# Patient Record
Sex: MALE | Race: WHITE | Employment: UNEMPLOYED | URBAN - METROPOLITAN AREA
[De-identification: names, ages, dates, MRNs, and addresses within clinical notes are randomized per-mention and may not be internally consistent; named-entity substitution may affect disease eponyms.]

---

## 2018-08-11 ENCOUNTER — HOSPITAL ENCOUNTER (EMERGENCY)
Age: 73
Discharge: HOME | End: 2018-08-11
Attending: EMERGENCY MEDICINE
Payer: OTHER GOVERNMENT

## 2018-08-11 ENCOUNTER — APPOINTMENT (OUTPATIENT)
Dept: GENERAL RADIOLOGY | Age: 73
End: 2018-08-11
Payer: OTHER GOVERNMENT

## 2018-08-11 VITALS
HEART RATE: 71 BPM | RESPIRATION RATE: 16 BRPM | OXYGEN SATURATION: 96 % | DIASTOLIC BLOOD PRESSURE: 79 MMHG | HEIGHT: 71 IN | SYSTOLIC BLOOD PRESSURE: 160 MMHG | WEIGHT: 185 LBS | BODY MASS INDEX: 25.9 KG/M2 | TEMPERATURE: 97.9 F

## 2018-08-11 DIAGNOSIS — S43.102A ACROMIOCLAVICULAR JOINT SEPARATION, TYPE 3, LEFT, INITIAL ENCOUNTER: Primary | ICD-10-CM

## 2018-08-11 LAB
ABSOLUTE EOS #: 0.3 K/UL (ref 0–0.4)
ABSOLUTE IMMATURE GRANULOCYTE: ABNORMAL K/UL (ref 0–0.3)
ABSOLUTE LYMPH #: 1.4 K/UL (ref 1–4.8)
ABSOLUTE MONO #: 0.5 K/UL (ref 0.1–1.3)
ANION GAP SERPL CALCULATED.3IONS-SCNC: 15 MMOL/L (ref 9–17)
BASOPHILS # BLD: 1 % (ref 0–2)
BASOPHILS ABSOLUTE: 0.1 K/UL (ref 0–0.2)
BUN BLDV-MCNC: 16 MG/DL (ref 8–23)
BUN/CREAT BLD: ABNORMAL (ref 9–20)
CALCIUM SERPL-MCNC: 9.5 MG/DL (ref 8.6–10.4)
CHLORIDE BLD-SCNC: 100 MMOL/L (ref 98–107)
CO2: 22 MMOL/L (ref 20–31)
CREAT SERPL-MCNC: 0.62 MG/DL (ref 0.7–1.2)
DIFFERENTIAL TYPE: ABNORMAL
EOSINOPHILS RELATIVE PERCENT: 3 % (ref 0–4)
GFR AFRICAN AMERICAN: >60 ML/MIN
GFR NON-AFRICAN AMERICAN: >60 ML/MIN
GFR SERPL CREATININE-BSD FRML MDRD: ABNORMAL ML/MIN/{1.73_M2}
GFR SERPL CREATININE-BSD FRML MDRD: ABNORMAL ML/MIN/{1.73_M2}
GLUCOSE BLD-MCNC: 152 MG/DL (ref 70–99)
HCT VFR BLD CALC: 64.3 % (ref 41–53)
HEMOGLOBIN: 20.7 G/DL (ref 13.5–17.5)
IMMATURE GRANULOCYTES: ABNORMAL %
LYMPHOCYTES # BLD: 14 % (ref 24–44)
MCH RBC QN AUTO: 27.2 PG (ref 26–34)
MCHC RBC AUTO-ENTMCNC: 32.2 G/DL (ref 31–37)
MCV RBC AUTO: 84.5 FL (ref 80–100)
MONOCYTES # BLD: 5 % (ref 1–7)
MORPHOLOGY: ABNORMAL
NRBC AUTOMATED: ABNORMAL PER 100 WBC
PDW BLD-RTO: 25.2 % (ref 11.5–14.9)
PLATELET # BLD: 264 K/UL (ref 150–450)
PLATELET ESTIMATE: ABNORMAL
PMV BLD AUTO: 9.8 FL (ref 6–12)
POTASSIUM SERPL-SCNC: 3.9 MMOL/L (ref 3.7–5.3)
RBC # BLD: 7.6 M/UL (ref 4.5–5.9)
RBC # BLD: ABNORMAL 10*6/UL
SEG NEUTROPHILS: 77 % (ref 36–66)
SEGMENTED NEUTROPHILS ABSOLUTE COUNT: 7.7 K/UL (ref 1.3–9.1)
SODIUM BLD-SCNC: 137 MMOL/L (ref 135–144)
WBC # BLD: 10 K/UL (ref 3.5–11)
WBC # BLD: ABNORMAL 10*3/UL

## 2018-08-11 PROCEDURE — 99283 EMERGENCY DEPT VISIT LOW MDM: CPT

## 2018-08-11 PROCEDURE — 96375 TX/PRO/DX INJ NEW DRUG ADDON: CPT

## 2018-08-11 PROCEDURE — 73030 X-RAY EXAM OF SHOULDER: CPT

## 2018-08-11 PROCEDURE — 36415 COLL VENOUS BLD VENIPUNCTURE: CPT

## 2018-08-11 PROCEDURE — 96374 THER/PROPH/DIAG INJ IV PUSH: CPT

## 2018-08-11 PROCEDURE — 80048 BASIC METABOLIC PNL TOTAL CA: CPT

## 2018-08-11 PROCEDURE — 85025 COMPLETE CBC W/AUTO DIFF WBC: CPT

## 2018-08-11 PROCEDURE — 6360000002 HC RX W HCPCS: Performed by: EMERGENCY MEDICINE

## 2018-08-11 RX ORDER — MORPHINE SULFATE 4 MG/ML
4 INJECTION, SOLUTION INTRAMUSCULAR; INTRAVENOUS ONCE
Status: COMPLETED | OUTPATIENT
Start: 2018-08-11 | End: 2018-08-11

## 2018-08-11 RX ORDER — ONDANSETRON 2 MG/ML
4 INJECTION INTRAMUSCULAR; INTRAVENOUS ONCE
Status: COMPLETED | OUTPATIENT
Start: 2018-08-11 | End: 2018-08-11

## 2018-08-11 RX ORDER — HYDROCODONE BITARTRATE AND ACETAMINOPHEN 5; 325 MG/1; MG/1
1 TABLET ORAL EVERY 6 HOURS PRN
Qty: 10 TABLET | Refills: 0 | Status: SHIPPED | OUTPATIENT
Start: 2018-08-11 | End: 2018-08-18

## 2018-08-11 RX ORDER — IBUPROFEN 800 MG/1
800 TABLET ORAL EVERY 8 HOURS PRN
Qty: 30 TABLET | Refills: 0 | Status: SHIPPED | OUTPATIENT
Start: 2018-08-11

## 2018-08-11 RX ADMIN — ONDANSETRON 4 MG: 2 INJECTION INTRAMUSCULAR; INTRAVENOUS at 03:10

## 2018-08-11 RX ADMIN — MORPHINE SULFATE 4 MG: 4 INJECTION INTRAVENOUS at 03:10

## 2018-08-11 ASSESSMENT — PAIN SCALES - GENERAL
PAINLEVEL_OUTOF10: 3
PAINLEVEL_OUTOF10: 3
PAINLEVEL_OUTOF10: 4

## 2018-08-11 ASSESSMENT — PAIN DESCRIPTION - DESCRIPTORS: DESCRIPTORS: ACHING;JABBING

## 2018-08-11 ASSESSMENT — ENCOUNTER SYMPTOMS
ABDOMINAL PAIN: 0
COLOR CHANGE: 0
CONSTIPATION: 0
NAUSEA: 0
BLOOD IN STOOL: 0
SORE THROAT: 0
COUGH: 0
BACK PAIN: 0
SHORTNESS OF BREATH: 0
DIARRHEA: 0
TROUBLE SWALLOWING: 0
VOMITING: 0

## 2018-08-11 ASSESSMENT — PAIN DESCRIPTION - ORIENTATION
ORIENTATION: LEFT
ORIENTATION: LEFT

## 2018-08-11 ASSESSMENT — PAIN DESCRIPTION - PAIN TYPE
TYPE: ACUTE PAIN
TYPE: ACUTE PAIN

## 2018-08-11 ASSESSMENT — PAIN DESCRIPTION - LOCATION
LOCATION: SHOULDER
LOCATION: SHOULDER

## 2018-08-11 NOTE — ED PROVIDER NOTES
[address]  eMERGENCY dEPARTMENT eNCOUnter    Pt Name: Hanane Good  MRN: [id number]  YOB: 1945  Date of evaluation: 8/11/18  PCP: No primary care provider on file. CHIEF COMPLAINT       Chief Complaint   Patient presents with    Shoulder Injury     fell hit brick wall right side       HISTORY OF PRESENT ILLNESS    Hanane Good is a 68 y.o. male who presents With left shoulder pain. At approximately 1:30 AM today patient was walking down his driveway and tripped. He fell onto a brick wall and hit his left shoulder. He did not hit his head and did not lose consciousness. He was able to stand up and ambulate. Complain of pain in his left shoulder this 3 out of 10 in severity. Movement makes it worse. Nothing alleviates the pain. No numbness or tingling in his arm. Denies any neck or back pain. Symptoms are acute. Patient has no other additional complaints at this time. REVIEW OF SYSTEMS       Review of Systems   Constitutional: Negative for chills, fatigue and fever. HENT: Negative for congestion, ear pain, sore throat and trouble swallowing. Eyes: Negative for visual disturbance. Respiratory: Negative for cough and shortness of breath. Cardiovascular: Negative for chest pain, palpitations and leg swelling. Gastrointestinal: Negative for abdominal pain, blood in stool, constipation, diarrhea, nausea and vomiting. Genitourinary: Negative for dysuria and flank pain. Musculoskeletal: Positive for arthralgias and joint swelling. Negative for back pain, myalgias and neck pain. Skin: Negative for color change, rash and wound. Neurological: Negative for dizziness, weakness, light-headedness, numbness and headaches. Psychiatric/Behavioral: Negative for confusion. All other systems reviewed and are negative. Negative in 10 essential Systems except as mentioned above and in the HPI.         PAST MEDICAL HISTORY    has a past medical history of Arthritis; CAD vitals reviewed. DIFFERENTIAL DIAGNOSIS/MDM:   80-year-old male presents with left shoulder pain after mechanical fall. He does have a deformity concerning for possible fracture or dislocation. Sensation is intact distally. Pulses are intact distally. Motor functions intact distally. No other injuries found. He does have some abrasions on the left shoulder as well. We'll get x-ray, treat pain. DIAGNOSTIC RESULTS     EKG: All EKG's are interpreted by the Emergency Department Physician who either signs or Co-signs this chart in the absence of a cardiologist.        RADIOLOGY:   I directly visualized the following  images and reviewed the radiologist interpretations:  XR SHOULDER LEFT (MIN 2 VIEWS)   Final Result   Grade 3 left AC joint separation. ED BEDSIDE ULTRASOUND:      LABS:  Labs Reviewed   CBC WITH AUTO DIFFERENTIAL - Abnormal; Notable for the following:        Result Value    RBC 7.60 (*)     Hemoglobin 20.7 (*)     Hematocrit 64.3 (*)     RDW 25.2 (*)     All other components within normal limits   BASIC METABOLIC PANEL - Abnormal; Notable for the following:     Glucose 152 (*)     CREATININE 0.62 (*)     All other components within normal limits         EMERGENCY DEPARTMENT COURSE:   Vitals:    Vitals:    08/11/18 0242 08/11/18 0318   BP: (!) 160/79    Pulse: 71 71   Resp: 16    TempSrc: Oral    SpO2: 96%    Weight: 185 lb (83.9 kg)    Height: 5' 11\" (1.803 m)      3:48 AM  Patient has a grade 3 AC joint separation of the left shoulder. No evidence of fracture. No glenohumeral dislocation. We'll place in sling and have him follow up with orthopedic surgery. We'll discharge her with pain medication. Advised to return if he develops any worsening pain, numbness, tingling or any other symptoms. Patient agreeable plan will be discharged home today. CRITICAL CARE:      CONSULTS:  None      PROCEDURES:      FINAL IMPRESSION      1.  Acromioclavicular joint separation, type 3, left, initial encounter            DISPOSITION/PLAN   DISPOSITION      Discharged home      PATIENT REFERRED TO:  [facility] ED  Donis Marquez [address]  [address] [address]  [phone]    As needed, If symptoms worsen    Shelby Maciel MD  [facility]  [address] [facility]  [address] [facility] [address]  [phone]    Schedule an appointment as soon as possible for a visit         DISCHARGE MEDICATIONS:  New Prescriptions    HYDROCODONE-ACETAMINOPHEN (NORCO) 5-325 MG PER TABLET    Take 1 tablet by mouth every 6 hours as needed for Pain for up to 7 days. .    IBUPROFEN (ADVIL;MOTRIN) 800 MG TABLET    Take 1 tablet by mouth every 8 hours as needed for Pain       (Please note that portions of this note were completed with a voice recognition program.  Efforts were made to edit the dictations but occasionally words are mis-transcribed.)    Ania Foster DO  Attending Emergency Physician          Ania Foster DO  08/11/18 8853

## 2022-12-21 ENCOUNTER — HOSPITAL ENCOUNTER (INPATIENT)
Age: 77
LOS: 2 days | Discharge: HOME | End: 2022-12-23
Attending: EMERGENCY MEDICINE | Admitting: STUDENT IN AN ORGANIZED HEALTH CARE EDUCATION/TRAINING PROGRAM
Payer: OTHER GOVERNMENT

## 2022-12-21 DIAGNOSIS — I82.402 ACUTE DEEP VEIN THROMBOSIS (DVT) OF LEFT LOWER EXTREMITY, UNSPECIFIED VEIN (HCC): Primary | ICD-10-CM

## 2022-12-21 DIAGNOSIS — L03.116 CELLULITIS OF LEFT LOWER EXTREMITY: ICD-10-CM

## 2022-12-21 PROBLEM — I25.10 CORONARY ARTERY DISEASE: Status: ACTIVE | Noted: 2022-12-21

## 2022-12-21 PROBLEM — I10 HYPERTENSION: Status: ACTIVE | Noted: 2022-12-21

## 2022-12-21 PROBLEM — Z85.51 HISTORY OF BLADDER CANCER: Status: ACTIVE | Noted: 2022-12-21

## 2022-12-21 PROBLEM — K74.60 LIVER CIRRHOSIS (HCC): Status: ACTIVE | Noted: 2022-12-21

## 2022-12-21 PROBLEM — H91.90 HEARING LOSS: Status: ACTIVE | Noted: 2022-12-21

## 2022-12-21 PROBLEM — E11.9 TYPE 2 DIABETES MELLITUS (HCC): Status: ACTIVE | Noted: 2022-12-21

## 2022-12-21 PROBLEM — Z85.05 HISTORY OF HEPATOCELLULAR CARCINOMA: Status: ACTIVE | Noted: 2022-12-21

## 2022-12-21 PROBLEM — I50.9 CHF (CONGESTIVE HEART FAILURE) (HCC): Status: ACTIVE | Noted: 2022-12-21

## 2022-12-21 PROBLEM — G20 PARKINSON'S DISEASE (HCC): Status: ACTIVE | Noted: 2022-12-21

## 2022-12-21 PROBLEM — I82.4Z2 DVT, LOWER EXTREMITY, DISTAL, ACUTE, LEFT (HCC): Status: ACTIVE | Noted: 2022-12-21

## 2022-12-21 LAB
ABSOLUTE EOS #: 0.23 K/UL (ref 0–0.44)
ABSOLUTE IMMATURE GRANULOCYTE: 0.18 K/UL (ref 0–0.3)
ABSOLUTE LYMPH #: 0.96 K/UL (ref 1.1–3.7)
ABSOLUTE MONO #: 0.66 K/UL (ref 0.1–1.2)
ANION GAP SERPL CALCULATED.3IONS-SCNC: 12 MMOL/L (ref 9–17)
BASOPHILS # BLD: 1 % (ref 0–2)
BASOPHILS ABSOLUTE: 0.14 K/UL (ref 0–0.2)
BUN BLDV-MCNC: 22 MG/DL (ref 8–23)
CALCIUM SERPL-MCNC: 9.1 MG/DL (ref 8.6–10.4)
CHLORIDE BLD-SCNC: 96 MMOL/L (ref 98–107)
CO2: 26 MMOL/L (ref 20–31)
CREAT SERPL-MCNC: 0.98 MG/DL (ref 0.7–1.2)
D-DIMER QUANTITATIVE: 0.7 MG/L FEU
EOSINOPHILS RELATIVE PERCENT: 2 % (ref 1–4)
GFR SERPL CREATININE-BSD FRML MDRD: >60 ML/MIN/1.73M2
GLUCOSE BLD-MCNC: 247 MG/DL (ref 70–99)
HCT VFR BLD CALC: 43.4 % (ref 40.7–50.3)
HEMOGLOBIN: 12.9 G/DL (ref 13–17)
IMMATURE GRANULOCYTES: 1 %
LYMPHOCYTES # BLD: 8 % (ref 24–43)
MCH RBC QN AUTO: 29.3 PG (ref 25.2–33.5)
MCHC RBC AUTO-ENTMCNC: 29.7 G/DL (ref 28.4–34.8)
MCV RBC AUTO: 98.4 FL (ref 82.6–102.9)
MONOCYTES # BLD: 5 % (ref 3–12)
NRBC AUTOMATED: 0 PER 100 WBC
PARTIAL THROMBOPLASTIN TIME: >120 SEC (ref 20.5–30.5)
PARTIAL THROMBOPLASTIN TIME: >120 SEC (ref 20.5–30.5)
PDW BLD-RTO: 15.1 % (ref 11.8–14.4)
PLATELET # BLD: 199 K/UL (ref 138–453)
PMV BLD AUTO: 13.1 FL (ref 8.1–13.5)
POTASSIUM SERPL-SCNC: 4.2 MMOL/L (ref 3.7–5.3)
RBC # BLD: 4.41 M/UL (ref 4.21–5.77)
RBC # BLD: ABNORMAL 10*6/UL
REASON FOR REJECTION: NORMAL
SEG NEUTROPHILS: 83 % (ref 36–65)
SEGMENTED NEUTROPHILS ABSOLUTE COUNT: 10.25 K/UL (ref 1.5–8.1)
SODIUM BLD-SCNC: 134 MMOL/L (ref 135–144)
WBC # BLD: 12.4 K/UL (ref 3.5–11.3)
ZZ NTE CLEAN UP: ORDERED TEST: NORMAL
ZZ NTE WITH NAME CLEAN UP: SPECIMEN SOURCE: NORMAL

## 2022-12-21 PROCEDURE — 6370000000 HC RX 637 (ALT 250 FOR IP): Performed by: STUDENT IN AN ORGANIZED HEALTH CARE EDUCATION/TRAINING PROGRAM

## 2022-12-21 PROCEDURE — 2060000000 HC ICU INTERMEDIATE R&B

## 2022-12-21 PROCEDURE — 80048 BASIC METABOLIC PNL TOTAL CA: CPT

## 2022-12-21 PROCEDURE — 85379 FIBRIN DEGRADATION QUANT: CPT

## 2022-12-21 PROCEDURE — 85730 THROMBOPLASTIN TIME PARTIAL: CPT

## 2022-12-21 PROCEDURE — 85025 COMPLETE CBC W/AUTO DIFF WBC: CPT

## 2022-12-21 PROCEDURE — 93971 EXTREMITY STUDY: CPT

## 2022-12-21 PROCEDURE — 96374 THER/PROPH/DIAG INJ IV PUSH: CPT

## 2022-12-21 PROCEDURE — 99285 EMERGENCY DEPT VISIT HI MDM: CPT

## 2022-12-21 PROCEDURE — 99254 IP/OBS CNSLTJ NEW/EST MOD 60: CPT | Performed by: STUDENT IN AN ORGANIZED HEALTH CARE EDUCATION/TRAINING PROGRAM

## 2022-12-21 PROCEDURE — 6360000002 HC RX W HCPCS: Performed by: STUDENT IN AN ORGANIZED HEALTH CARE EDUCATION/TRAINING PROGRAM

## 2022-12-21 RX ORDER — CEPHALEXIN 500 MG/1
500 CAPSULE ORAL 4 TIMES DAILY
Status: DISCONTINUED | OUTPATIENT
Start: 2022-12-21 | End: 2022-12-23 | Stop reason: HOSPADM

## 2022-12-21 RX ORDER — SODIUM CHLORIDE 0.9 % (FLUSH) 0.9 %
5-40 SYRINGE (ML) INJECTION EVERY 12 HOURS SCHEDULED
Status: DISCONTINUED | OUTPATIENT
Start: 2022-12-21 | End: 2022-12-23 | Stop reason: HOSPADM

## 2022-12-21 RX ORDER — HEPARIN SODIUM 1000 [USP'U]/ML
80 INJECTION, SOLUTION INTRAVENOUS; SUBCUTANEOUS ONCE
Status: COMPLETED | OUTPATIENT
Start: 2022-12-21 | End: 2022-12-21

## 2022-12-21 RX ORDER — SODIUM CHLORIDE 0.9 % (FLUSH) 0.9 %
5-40 SYRINGE (ML) INJECTION PRN
Status: DISCONTINUED | OUTPATIENT
Start: 2022-12-21 | End: 2022-12-23 | Stop reason: HOSPADM

## 2022-12-21 RX ORDER — DEXTROSE MONOHYDRATE 100 MG/ML
INJECTION, SOLUTION INTRAVENOUS CONTINUOUS PRN
Status: DISCONTINUED | OUTPATIENT
Start: 2022-12-21 | End: 2022-12-22

## 2022-12-21 RX ORDER — DOXAZOSIN 2 MG/1
4 TABLET ORAL DAILY
Status: DISCONTINUED | OUTPATIENT
Start: 2022-12-21 | End: 2022-12-23 | Stop reason: HOSPADM

## 2022-12-21 RX ORDER — POTASSIUM CHLORIDE 750 MG/1
10 TABLET, FILM COATED, EXTENDED RELEASE ORAL DAILY
Status: ON HOLD | COMMUNITY
Start: 2022-10-12 | End: 2022-12-22

## 2022-12-21 RX ORDER — ACETAMINOPHEN 325 MG/1
650 TABLET ORAL EVERY 6 HOURS PRN
Status: DISCONTINUED | OUTPATIENT
Start: 2022-12-21 | End: 2022-12-23 | Stop reason: HOSPADM

## 2022-12-21 RX ORDER — FUROSEMIDE 40 MG/1
40 TABLET ORAL EVERY MORNING
Status: DISCONTINUED | OUTPATIENT
Start: 2022-12-22 | End: 2022-12-23 | Stop reason: HOSPADM

## 2022-12-21 RX ORDER — OMEPRAZOLE 20 MG/1
20 CAPSULE, DELAYED RELEASE ORAL
COMMUNITY
Start: 2022-04-28

## 2022-12-21 RX ORDER — ASPIRIN 81 MG/1
81 TABLET ORAL DAILY
Status: DISCONTINUED | OUTPATIENT
Start: 2022-12-21 | End: 2022-12-23 | Stop reason: HOSPADM

## 2022-12-21 RX ORDER — CARVEDILOL 12.5 MG/1
12.5 TABLET ORAL 2 TIMES DAILY WITH MEALS
COMMUNITY
Start: 2022-11-10

## 2022-12-21 RX ORDER — HEPARIN SODIUM 1000 [USP'U]/ML
80 INJECTION, SOLUTION INTRAVENOUS; SUBCUTANEOUS PRN
Status: DISCONTINUED | OUTPATIENT
Start: 2022-12-21 | End: 2022-12-23 | Stop reason: HOSPADM

## 2022-12-21 RX ORDER — ONDANSETRON 2 MG/ML
4 INJECTION INTRAMUSCULAR; INTRAVENOUS EVERY 6 HOURS PRN
Status: DISCONTINUED | OUTPATIENT
Start: 2022-12-21 | End: 2022-12-23 | Stop reason: HOSPADM

## 2022-12-21 RX ORDER — SPIRONOLACTONE 25 MG/1
75 TABLET ORAL EVERY MORNING
COMMUNITY
Start: 2022-05-05

## 2022-12-21 RX ORDER — ACETAMINOPHEN 650 MG/1
650 SUPPOSITORY RECTAL EVERY 6 HOURS PRN
Status: DISCONTINUED | OUTPATIENT
Start: 2022-12-21 | End: 2022-12-23 | Stop reason: HOSPADM

## 2022-12-21 RX ORDER — INSULIN LISPRO 100 [IU]/ML
0-4 INJECTION, SOLUTION INTRAVENOUS; SUBCUTANEOUS
Status: DISCONTINUED | OUTPATIENT
Start: 2022-12-22 | End: 2022-12-22 | Stop reason: SDUPTHER

## 2022-12-21 RX ORDER — ONDANSETRON 4 MG/1
4 TABLET, ORALLY DISINTEGRATING ORAL EVERY 8 HOURS PRN
Status: DISCONTINUED | OUTPATIENT
Start: 2022-12-21 | End: 2022-12-23 | Stop reason: HOSPADM

## 2022-12-21 RX ORDER — DULOXETIN HYDROCHLORIDE 20 MG/1
20 CAPSULE, DELAYED RELEASE ORAL DAILY
COMMUNITY
Start: 2022-09-26

## 2022-12-21 RX ORDER — LOSARTAN POTASSIUM 50 MG/1
50 TABLET ORAL DAILY
COMMUNITY
Start: 2022-11-10

## 2022-12-21 RX ORDER — FUROSEMIDE 40 MG/1
40 TABLET ORAL EVERY MORNING
COMMUNITY
Start: 2022-10-24

## 2022-12-21 RX ORDER — SODIUM CHLORIDE 9 MG/ML
INJECTION, SOLUTION INTRAVENOUS PRN
Status: DISCONTINUED | OUTPATIENT
Start: 2022-12-21 | End: 2022-12-23 | Stop reason: HOSPADM

## 2022-12-21 RX ORDER — POLYETHYLENE GLYCOL 3350 17 G/17G
17 POWDER, FOR SOLUTION ORAL DAILY PRN
Status: DISCONTINUED | OUTPATIENT
Start: 2022-12-21 | End: 2022-12-23 | Stop reason: HOSPADM

## 2022-12-21 RX ORDER — TERAZOSIN 2 MG/1
8 CAPSULE ORAL NIGHTLY
COMMUNITY
Start: 2022-10-12

## 2022-12-21 RX ORDER — HEPARIN SODIUM 1000 [USP'U]/ML
40 INJECTION, SOLUTION INTRAVENOUS; SUBCUTANEOUS PRN
Status: DISCONTINUED | OUTPATIENT
Start: 2022-12-21 | End: 2022-12-23 | Stop reason: HOSPADM

## 2022-12-21 RX ORDER — HEPARIN SODIUM AND DEXTROSE 10000; 5 [USP'U]/100ML; G/100ML
5-30 INJECTION INTRAVENOUS CONTINUOUS
Status: DISCONTINUED | OUTPATIENT
Start: 2022-12-21 | End: 2022-12-23 | Stop reason: HOSPADM

## 2022-12-21 RX ORDER — INSULIN LISPRO 100 [IU]/ML
0-4 INJECTION, SOLUTION INTRAVENOUS; SUBCUTANEOUS NIGHTLY
Status: DISCONTINUED | OUTPATIENT
Start: 2022-12-21 | End: 2022-12-22 | Stop reason: SDUPTHER

## 2022-12-21 RX ORDER — HYDROXYUREA 500 MG/1
500 CAPSULE ORAL DAILY
COMMUNITY
Start: 2022-09-01

## 2022-12-21 RX ORDER — ASPIRIN 81 MG/1
81 TABLET ORAL DAILY
COMMUNITY
Start: 2017-02-21

## 2022-12-21 RX ADMIN — HEPARIN SODIUM 7260 UNITS: 1000 INJECTION INTRAVENOUS; SUBCUTANEOUS at 18:49

## 2022-12-21 RX ADMIN — CEPHALEXIN 500 MG: 500 CAPSULE ORAL at 18:02

## 2022-12-21 RX ADMIN — HEPARIN SODIUM 18 UNITS/KG/HR: 10000 INJECTION, SOLUTION INTRAVENOUS at 18:54

## 2022-12-21 ASSESSMENT — ENCOUNTER SYMPTOMS
ABDOMINAL PAIN: 0
ALLERGIC/IMMUNOLOGIC COMMENTS: NKA
BACK PAIN: 0
FACIAL SWELLING: 0
SHORTNESS OF BREATH: 0

## 2022-12-21 ASSESSMENT — PAIN - FUNCTIONAL ASSESSMENT: PAIN_FUNCTIONAL_ASSESSMENT: 0-10

## 2022-12-21 ASSESSMENT — PAIN SCALES - GENERAL: PAINLEVEL_OUTOF10: 6

## 2022-12-21 NOTE — ED NOTES
The following labs were labeled with appropriate pt sticker and tubed to lab:     [x] Blue     [x] Lavender   [] on ice  [x] Green/yellow  [] Green/black [] on ice  [] Arlester Core  [] on ice  [] Yellow  [] Red  [] Type/ Screen  [] ABG  [] VBG    [] COVID-19 swab    [] Rapid  [] PCR  [] Flu swab  [] Peds Viral Panel     [] Urine Sample  [] Fecal Sample  [] Pelvic Cultures  [] Blood Cultures  [] X 2  [] STREP Cultures         Rekha Calvin RN  12/21/22 5370

## 2022-12-21 NOTE — ED PROVIDER NOTES
[address]  ED  Emergency Department Encounter  Emergency Medicine Resident     Pt Name:Fran Cheung  MRN: [id number]  [place] 1945  Date of evaluation: 12/21/22  PCP:  No primary care provider on file. CHIEF COMPLAINT       Chief Complaint   Patient presents with    Leg Swelling     Redness, pt believes it may be a DVT       HISTORY OF PRESENT ILLNESS  (Location/Symptom, Timing/Onset, Context/Setting, Quality, Duration, Modifying Factors, Severity.)      Krystal Sosa is a 68 y.o. male who presents with left leg swelling that has been ongoing for the past few days. Patient reports that the swelling has been progressively worsening. Started as a small red area on the medial side of his left calf and has expanded. Patient does report that he had chills the other day, did not have any fevers. Does not have any history of blood clots. Does have history of cirrhosis, diabetes. No recent injuries to the leg. No recent infections. Patient reports that he is on around 17 medications, is unable to provide list at this time however he will talk with his wife. Does report that he takes baby aspirin. Pain 6/10. PAST MEDICAL / SURGICAL / SOCIAL / FAMILY HISTORY      has a past medical history of Arthritis, CAD (coronary artery disease), Cancer ([address] Cirrhosis ([address] Diabetes mellitus ([address] Hypertension, and Parkinson disease ([address] has a past surgical history that includes Coronary angioplasty with stent (2003); Mouth surgery (06/2018); and Cochlear implant (Bilateral).     Social History     Socioeconomic History    Marital status:      Spouse name: Not on file    Number of children: Not on file    Years of education: Not on file    Highest education level: Not on file   Occupational History    Not on file   Tobacco Use    Smoking status: Former    Smokeless tobacco: Never   Substance and Sexual Activity    Alcohol use: No    Drug use: No    Sexual activity: Not on file Other Topics Concern    Not on file   Social History Narrative    Not on file     Social Determinants of Health     Financial Resource Strain: Not on file   Food Insecurity: Not on file   Transportation Needs: Not on file   Physical Activity: Not on file   Stress: Not on file   Social Connections: Not on file   Intimate Partner Violence: Not on file   Housing Stability: Not on file       No family history on file. Allergies:  Patient has no known allergies. Home Medications:  Prior to Admission medications    Medication Sig Start Date End Date Taking? Authorizing Provider   ibuprofen (ADVIL;MOTRIN) 800 MG tablet Take 1 tablet by mouth every 8 hours as needed for Pain 8/11/18   Jose Brown, DO       REVIEW OF SYSTEMS    (2-9 systems for level 4, 10 or more for level 5)      Review of Systems   Constitutional:  Positive for chills. Negative for fever. HENT:  Negative for facial swelling. Respiratory:  Negative for shortness of breath. Cardiovascular:  Positive for leg swelling. Negative for chest pain. Gastrointestinal:  Negative for abdominal pain. Musculoskeletal:  Negative for back pain. Skin:  Negative for wound. Allergic/Immunologic:        NKA   Neurological:  Negative for headaches. Hematological:         Baby aspirin   Psychiatric/Behavioral:  Negative for confusion. PHYSICAL EXAM   (up to 7 for level 4, 8 or more for level 5)      INITIAL VITALS:   /73   Pulse 92   Temp 96.9 °F (36.1 °C)   Resp 16   Ht 5' 11\" (1.803 m)   Wt 200 lb (90.7 kg)   SpO2 97%   BMI 27.89 kg/m²     Physical Exam  Constitutional:       General: He is not in acute distress. HENT:      Head: Normocephalic and atraumatic. Right Ear: External ear normal.      Left Ear: External ear normal.      Nose: Nose normal.   Eyes:      Conjunctiva/sclera: Conjunctivae normal.   Cardiovascular:      Rate and Rhythm: Normal rate. Pulses: Normal pulses.    Pulmonary:      Effort: Pulmonary effort is normal. No respiratory distress. Abdominal:      General: Abdomen is flat. There is no distension. Palpations: Abdomen is soft. Tenderness: There is no abdominal tenderness. There is no guarding or rebound. Musculoskeletal:      Cervical back: No tenderness. Comments: Left lower extremity with 1+ pitting edema, area of erythema, warmth that he reports is tender to palpation. Skin:     General: Skin is warm. Capillary Refill: Capillary refill takes less than 2 seconds. Neurological:      Mental Status: He is alert and oriented to person, place, and time.    Psychiatric:         Mood and Affect: Mood normal.       DIFFERENTIAL  DIAGNOSIS     PLAN (LABS / IMAGING / EKG):  Orders Placed This Encounter   Procedures    VL DUP LOWER EXTREMITY VENOUS LEFT    CBC with Auto Differential    Basic Metabolic Panel    D-Dimer, Quantitative    Inpatient consult to Hospitalist    Inpatient consult to Vascular Surgery       MEDICATIONS ORDERED:  Orders Placed This Encounter   Medications    FOLLOWED BY Linked Order Group     rivaroxaban (XARELTO) tablet 15 mg      Order Specific Question:   Indication of Use      Answer:   Treatment-DVT/PE     rivaroxaban (XARELTO) tablet 20 mg      Order Specific Question:   Indication of Use      Answer:   Treatment-DVT/PE    cephALEXin (KEFLEX) capsule 500 mg     Order Specific Question:   Antimicrobial Indications     Answer:   Skin and Soft Tissue Infection       DDX: DVT, cellulitis, fracture, sprain, strain    DIAGNOSTIC RESULTS / EMERGENCY DEPARTMENT COURSE / MDM   LAB RESULTS:  Results for orders placed or performed during the hospital encounter of 12/21/22   CBC with Auto Differential   Result Value Ref Range    WBC 12.4 (H) 3.5 - 11.3 k/uL    RBC 4.41 4.21 - 5.77 m/uL    Hemoglobin 12.9 (L) 13.0 - 17.0 g/dL    Hematocrit 43.4 40.7 - 50.3 %    MCV 98.4 82.6 - 102.9 fL    MCH 29.3 25.2 - 33.5 pg    MCHC 29.7 28.4 - 34.8 g/dL    RDW 15.1 (H) 11.8 - 14.4 % Platelets 477 750 - 230 k/uL    MPV 13.1 8.1 - 13.5 fL    NRBC Automated 0.0 0.0 per 100 WBC    Seg Neutrophils 83 (H) 36 - 65 %    Lymphocytes 8 (L) 24 - 43 %    Monocytes 5 3 - 12 %    Eosinophils % 2 1 - 4 %    Basophils 1 0 - 2 %    Immature Granulocytes 1 (H) 0 %    Segs Absolute 10.25 (H) 1.50 - 8.10 k/uL    Absolute Lymph # 0.96 (L) 1.10 - 3.70 k/uL    Absolute Mono # 0.66 0.10 - 1.20 k/uL    Absolute Eos # 0.23 0.00 - 0.44 k/uL    Basophils Absolute 0.14 0.00 - 0.20 k/uL    Absolute Immature Granulocyte 0.18 0.00 - 0.30 k/uL    RBC Morphology ANISOCYTOSIS PRESENT    Basic Metabolic Panel   Result Value Ref Range    Glucose 247 (H) 70 - 99 mg/dL    BUN 22 8 - 23 mg/dL    Creatinine 0.98 0.70 - 1.20 mg/dL    Est, Glom Filt Rate >60 >60 mL/min/1.73m2    Calcium 9.1 8.6 - 10.4 mg/dL    Sodium 134 (L) 135 - 144 mmol/L    Potassium 4.2 3.7 - 5.3 mmol/L    Chloride 96 (L) 98 - 107 mmol/L    CO2 26 20 - 31 mmol/L    Anion Gap 12 9 - 17 mmol/L   D-Dimer, Quantitative   Result Value Ref Range    D-Dimer, Quant 0.70 mg/L FEU         RADIOLOGY:  VL DUP LOWER EXTREMITY VENOUS LEFT    (Results Pending)           EMERGENCY DEPARTMENT COURSE:  ED Course as of 12/21/22 1803   Wed Dec 21, 2022   1545 WBC(!): 12.4  Possibly cellulitis. [ML]   1552 D-Dimer, Quant: 0.70  Going for DVT ultrasound. [ML]   56 Spoke with vascular lab, patient has extensive deep vein thrombosis starting at the distal greater saphenous vein going to saphenous femoral junction. [ML]   2070 D/w pharmacy will be starting him on Xarelto. [ML]   1726 Admitted to medicine, will obtain vasc consult. [ML]      ED Course User Index  [ML] Jackie Mullins, DO       CONSULTS:  IP CONSULT TO HOSPITALIST  IP CONSULT TO VASCULAR SURGERY      FINAL IMPRESSION      1. Acute deep vein thrombosis (DVT) of left lower extremity, unspecified vein (HCC)    2.  Cellulitis of left lower extremity          DISPOSITION / PLAN     DISPOSITION Decision To Admit 12/21/2022 05:29:56 PM          Gabby Bell DO  Emergency Medicine Resident    (Please note that portions of thisnote were completed with a voice recognition program.  Efforts were made to edit the dictations but occasionally words are mis-transcribed.)       Emiliano Hilton DO  Resident  12/21/22 2778

## 2022-12-21 NOTE — PROGRESS NOTES
Direct oral anticoagulant (DOAC) - Initial Pharmacy Review  New start? Yes  DOAC/dose: Xarelto 15 mg twice daily for 21 days then 20 mg daily  Indication: New onset DVT    Recent Labs     12/21/22  1533   HGB 12.9*   HCT 43.4        No results for input(s): INR in the last 72 hours. Recent Labs     12/21/22  1533   CREATININE 0.98     Estimated Creatinine Clearance: 73 mL/min (based on SCr of 0.98 mg/dL). Significant Drug-Drug Interactions: No interactions/no new drug interactions identified requiring action. Notes: No obvious intervention needed.

## 2022-12-21 NOTE — ED TRIAGE NOTES
Patient arrived to ED with c/o left leg pain and swelling that has been persistent for several days. Patient reports redness in LLE calf. Patient denies CP, SOB, and cough at this time. Patient is Aox4 and shows no signs of distress at this time.   Dr. Ted Tierney is at bedside for eval.

## 2022-12-21 NOTE — ED NOTES
The following labs were labeled with appropriate pt sticker and tubed to lab:     [x] Blue     [] Lavender   [] on ice  [] Green/yellow  [] Green/black [] on ice  [] Jina Postal  [] on ice  [] Yellow  [] Red  [] Type/ Screen  [] ABG  [] VBG    [] COVID-19 swab    [] Rapid  [] PCR  [] Flu swab  [] Peds Viral Panel     [] Urine Sample  [] Fecal Sample  [] Pelvic Cultures  [] Blood Cultures  [] X 2  [] STREP Cultures         Summer CARMEN Calvin  12/21/22 5673

## 2022-12-21 NOTE — ED PROVIDER NOTES
[address] HANDOFF       Handoff taken on the following patient from prior Attending Physician:  Pt Name: Porfirio Rogers  PCP:  No primary care provider on file. Attestation  I was available and discussed any additional care issues that arose and coordinated the management plans with the resident(s) caring for the patient during my duty period. Any areas of disagreement with resident's documentation of care or procedures are noted on the chart. I was personally present for the key portions of any/all procedures during my duty period. I have documented in the chart those procedures where I was not present during the key portions. CHIEF COMPLAINT       Chief Complaint   Patient presents with    Leg Swelling     Redness, pt believes it may be a DVT         CURRENT MEDICATIONS     Previous Medications  Previous Medications    IBUPROFEN (ADVIL;MOTRIN) 800 MG TABLET    Take 1 tablet by mouth every 8 hours as needed for Pain       Encounter Medications  Orders Placed This Encounter   Medications    DISCONTD: rivaroxaban (XARELTO) tablet 15 mg     Order Specific Question:   Indication of Use     Answer:   Treatment-DVT/PE    DISCONTD: rivaroxaban (XARELTO) tablet 20 mg     Order Specific Question:   Indication of Use     Answer:   Treatment-DVT/PE    cephALEXin (KEFLEX) capsule 500 mg     Order Specific Question:   Antimicrobial Indications     Answer:   Skin and Soft Tissue Infection    heparin (porcine) injection 7,260 Units    heparin (porcine) injection 7,260 Units    heparin (porcine) injection 3,630 Units    heparin 25,000 units in dextrose 5 % 250 mL infusion (rate based)       ALLERGIES     has No Known Allergies.       RECENT VITALS:   Temp: 96.9 °F (36.1 °C),  Heart Rate: 92, Resp: 16, BP: 137/73    RADIOLOGY:   VL DUP LOWER EXTREMITY VENOUS LEFT    (Results Pending)       LABS:  Labs Reviewed   CBC WITH AUTO DIFFERENTIAL - Abnormal; Notable for the following components: Result Value    WBC 12.4 (*)     Hemoglobin 12.9 (*)     RDW 15.1 (*)     Seg Neutrophils 83 (*)     Lymphocytes 8 (*)     Immature Granulocytes 1 (*)     Segs Absolute 10.25 (*)     Absolute Lymph # 0.96 (*)     All other components within normal limits   BASIC METABOLIC PANEL - Abnormal; Notable for the following components:    Glucose 247 (*)     Sodium 134 (*)     Chloride 96 (*)     All other components within normal limits   D-DIMER, QUANTITATIVE   APTT   APTT   APTT     Left lower extremity swelling redness and discomfort. History of diabetes and cirrhosis. Clinical concern for cellulitis. Also suspicion for DVT. Venous Doppler confirms DVT. We will start DOAC anticoagulation. With large area of cellulitis and multiple risk factors we will start IV antibiotics and admission for cellulitis. Will obtain vascular surgery consultation during admission      PLAN/ TASKS OUTSTANDING     Venous Doppler, laboratory studies, reassess, disposition      (Please note that portions of this note were completed with a voice recognition program.  Efforts were made to edit the dictations but occasionally words are mis-transcribed. )    Clayton Dunn MD,, MD, F.A.C.E.P.   Attending Emergency Physician        Clayton Dunn MD  12/21/22 1730

## 2022-12-21 NOTE — ED PROVIDER NOTES
[facility]     Emergency Department     Faculty Attestation    I performed a history and physical examination of the patient and discussed management with the resident. I have reviewed and agree with the residents findings including all diagnostic interpretations, and treatment plans as written at the time of my review. Any areas of disagreement are noted on the chart. I was personally present for the key portions of any procedures. I have documented in the chart those procedures where I was not present during the key portions. For Physician Assistant/ Nurse Practitioner cases/documentation I have personally evaluated this patient and have completed at least one if not all key elements of the E/M (history, physical exam, and MDM). Additional findings are as noted. Primary Care Physician: No primary care provider on file. History: This is a 68 y.o. male who presents to the Emergency Department with complaint of leg pain and swelling. This been ongoing for the past several days. Patient said initially started as a red streak in his lower left leg. He denies any cough chest pain shortness of breath. Physical:   height is 5' 11\" (1.803 m) and weight is 200 lb (90.7 kg). His temperature is 96.9 °F (36.1 °C). His blood pressure is 137/73 and his pulse is 92. His respiration is 16 and oxygen saturation is 97%. Left lower leg is edematous and larger than the right lower extremity. There is some erythema and warmth to the lower extremity medial aspect. This is mid calf and distally. Impression: Cellulitis, rule out DVT    Plan: Venous Doppler, antibiotics    (Please note that portions of this note were completed with a voice recognition program.  Efforts were made to edit the dictations but occasionally words are mis-transcribed.)    Alphonse Newby.  Alyssa Osman MD, [facility]  Attending Emergency Medicine Physician        Landen Plummer MD  12/21/22 1609

## 2022-12-22 PROBLEM — I82.402 ACUTE DEEP VEIN THROMBOSIS (DVT) OF LEFT LOWER EXTREMITY (HCC): Status: ACTIVE | Noted: 2022-12-21

## 2022-12-22 LAB
GLUCOSE BLD-MCNC: 158 MG/DL (ref 75–110)
GLUCOSE BLD-MCNC: 164 MG/DL (ref 75–110)
GLUCOSE BLD-MCNC: 166 MG/DL (ref 75–110)
GLUCOSE BLD-MCNC: 171 MG/DL (ref 75–110)
GLUCOSE BLD-MCNC: 191 MG/DL (ref 75–110)
PARTIAL THROMBOPLASTIN TIME: 27.6 SEC (ref 20.5–30.5)
PARTIAL THROMBOPLASTIN TIME: >120 SEC (ref 20.5–30.5)

## 2022-12-22 PROCEDURE — 6370000000 HC RX 637 (ALT 250 FOR IP): Performed by: STUDENT IN AN ORGANIZED HEALTH CARE EDUCATION/TRAINING PROGRAM

## 2022-12-22 PROCEDURE — 2060000000 HC ICU INTERMEDIATE R&B

## 2022-12-22 PROCEDURE — 2580000003 HC RX 258

## 2022-12-22 PROCEDURE — 82947 ASSAY GLUCOSE BLOOD QUANT: CPT

## 2022-12-22 PROCEDURE — 36415 COLL VENOUS BLD VENIPUNCTURE: CPT

## 2022-12-22 PROCEDURE — 6360000002 HC RX W HCPCS: Performed by: STUDENT IN AN ORGANIZED HEALTH CARE EDUCATION/TRAINING PROGRAM

## 2022-12-22 PROCEDURE — 85730 THROMBOPLASTIN TIME PARTIAL: CPT

## 2022-12-22 PROCEDURE — 99222 1ST HOSP IP/OBS MODERATE 55: CPT | Performed by: STUDENT IN AN ORGANIZED HEALTH CARE EDUCATION/TRAINING PROGRAM

## 2022-12-22 PROCEDURE — 6370000000 HC RX 637 (ALT 250 FOR IP)

## 2022-12-22 RX ORDER — ALBUTEROL SULFATE 90 UG/1
2 AEROSOL, METERED RESPIRATORY (INHALATION) EVERY 6 HOURS PRN
Status: DISCONTINUED | OUTPATIENT
Start: 2022-12-22 | End: 2022-12-23 | Stop reason: HOSPADM

## 2022-12-22 RX ORDER — ALBUTEROL SULFATE 90 UG/1
2 AEROSOL, METERED RESPIRATORY (INHALATION) EVERY 6 HOURS PRN
COMMUNITY

## 2022-12-22 RX ORDER — PREDNISOLONE ACETATE 10 MG/ML
2 SUSPENSION/ DROPS OPHTHALMIC 3 TIMES DAILY
Status: ON HOLD | COMMUNITY
End: 2022-12-22

## 2022-12-22 RX ORDER — CLOBETASOL PROPIONATE 0.5 MG/G
1 OINTMENT TOPICAL 2 TIMES DAILY
COMMUNITY

## 2022-12-22 RX ORDER — CARVEDILOL 12.5 MG/1
12.5 TABLET ORAL 2 TIMES DAILY WITH MEALS
Status: DISCONTINUED | OUTPATIENT
Start: 2022-12-22 | End: 2022-12-23 | Stop reason: HOSPADM

## 2022-12-22 RX ORDER — INSULIN LISPRO 100 [IU]/ML
0-4 INJECTION, SOLUTION INTRAVENOUS; SUBCUTANEOUS
Status: DISCONTINUED | OUTPATIENT
Start: 2022-12-22 | End: 2022-12-23 | Stop reason: HOSPADM

## 2022-12-22 RX ORDER — DEXTROSE MONOHYDRATE 100 MG/ML
INJECTION, SOLUTION INTRAVENOUS CONTINUOUS PRN
Status: DISCONTINUED | OUTPATIENT
Start: 2022-12-22 | End: 2022-12-23 | Stop reason: HOSPADM

## 2022-12-22 RX ORDER — POTASSIUM CHLORIDE 750 MG/1
10 TABLET, FILM COATED, EXTENDED RELEASE ORAL DAILY
COMMUNITY

## 2022-12-22 RX ORDER — LOSARTAN POTASSIUM 50 MG/1
50 TABLET ORAL DAILY
Status: DISCONTINUED | OUTPATIENT
Start: 2022-12-22 | End: 2022-12-23 | Stop reason: HOSPADM

## 2022-12-22 RX ORDER — CEPHALEXIN 500 MG/1
500 CAPSULE ORAL 2 TIMES DAILY
Status: ON HOLD | COMMUNITY
End: 2022-12-23 | Stop reason: HOSPADM

## 2022-12-22 RX ORDER — DULOXETIN HYDROCHLORIDE 20 MG/1
20 CAPSULE, DELAYED RELEASE ORAL DAILY
Status: DISCONTINUED | OUTPATIENT
Start: 2022-12-22 | End: 2022-12-23 | Stop reason: HOSPADM

## 2022-12-22 RX ORDER — CIPROFLOXACIN AND DEXAMETHASONE 3; 1 MG/ML; MG/ML
4 SUSPENSION/ DROPS AURICULAR (OTIC) 2 TIMES DAILY
COMMUNITY
Start: 2022-12-06

## 2022-12-22 RX ORDER — LIDOCAINE 50 MG/G
4 OINTMENT TOPICAL 4 TIMES DAILY
COMMUNITY
Start: 2022-03-09

## 2022-12-22 RX ORDER — CIPROFLOXACIN AND DEXAMETHASONE 3; 1 MG/ML; MG/ML
4 SUSPENSION/ DROPS AURICULAR (OTIC) 2 TIMES DAILY
Status: DISCONTINUED | OUTPATIENT
Start: 2022-12-22 | End: 2022-12-23 | Stop reason: HOSPADM

## 2022-12-22 RX ORDER — INSULIN LISPRO 100 [IU]/ML
0-4 INJECTION, SOLUTION INTRAVENOUS; SUBCUTANEOUS NIGHTLY
Status: DISCONTINUED | OUTPATIENT
Start: 2022-12-22 | End: 2022-12-23 | Stop reason: HOSPADM

## 2022-12-22 RX ORDER — PANTOPRAZOLE SODIUM 20 MG/1
20 TABLET, DELAYED RELEASE ORAL
Status: DISCONTINUED | OUTPATIENT
Start: 2022-12-23 | End: 2022-12-23 | Stop reason: HOSPADM

## 2022-12-22 RX ADMIN — HEPARIN SODIUM 7260 UNITS: 1000 INJECTION INTRAVENOUS; SUBCUTANEOUS at 21:42

## 2022-12-22 RX ADMIN — DULOXETINE 20 MG: 20 CAPSULE, DELAYED RELEASE ORAL at 18:41

## 2022-12-22 RX ADMIN — CEPHALEXIN 500 MG: 500 CAPSULE ORAL at 20:46

## 2022-12-22 RX ADMIN — CEPHALEXIN 500 MG: 500 CAPSULE ORAL at 14:09

## 2022-12-22 RX ADMIN — CIPROFLOXACIN AND DEXAMETHASONE 4 DROP: 3; 1 SUSPENSION/ DROPS AURICULAR (OTIC) at 23:34

## 2022-12-22 RX ADMIN — CEPHALEXIN 500 MG: 500 CAPSULE ORAL at 08:26

## 2022-12-22 RX ADMIN — SODIUM CHLORIDE, PRESERVATIVE FREE 5 ML: 5 INJECTION INTRAVENOUS at 22:07

## 2022-12-22 RX ADMIN — SODIUM CHLORIDE, PRESERVATIVE FREE 10 ML: 5 INJECTION INTRAVENOUS at 01:25

## 2022-12-22 RX ADMIN — CEPHALEXIN 500 MG: 500 CAPSULE ORAL at 18:40

## 2022-12-22 RX ADMIN — CEPHALEXIN 500 MG: 500 CAPSULE ORAL at 00:03

## 2022-12-22 RX ADMIN — CARVEDILOL 12.5 MG: 12.5 TABLET, FILM COATED ORAL at 18:40

## 2022-12-22 RX ADMIN — ASPIRIN 81 MG: 81 TABLET, COATED ORAL at 01:24

## 2022-12-22 RX ADMIN — CIPROFLOXACIN AND DEXAMETHASONE 4 DROP: 3; 1 SUSPENSION/ DROPS AURICULAR (OTIC) at 16:21

## 2022-12-22 RX ADMIN — HEPARIN SODIUM 10 UNITS/KG/HR: 10000 INJECTION, SOLUTION INTRAVENOUS at 19:10

## 2022-12-22 RX ADMIN — LOSARTAN POTASSIUM 50 MG: 50 TABLET, FILM COATED ORAL at 18:40

## 2022-12-22 NOTE — ED NOTES
Pt given meal according to diet order; Pt attached to monitor, RR even and non labored, call light within reach.       Khurram Lubin RN  12/22/22 3161

## 2022-12-22 NOTE — ED NOTES
Wife at bedside. Pt is eating his dinner brought by wife. Tolerated well. Will cont plan of care.      Johnathan Manning RN  12/21/22 1941

## 2022-12-22 NOTE — PROGRESS NOTES
[facility]  Internal Medicine Teaching Residency Program  Inpatient Daily Progress Note  ______________________________________________________________________________    Patient: Isma Sevilla  YOB: 1945   RBY:[id number]    Acct: [de-identified]     Room: 03/03  Admit date: 12/21/2022  Today's date: 12/22/22  Number of days in the hospital: 1    SUBJECTIVE   Admitting Diagnosis: DVT, lower extremity, distal, acute, left (HCC)  CC: Left leg swelling and pain    Pt examined at bedside. Chart & results reviewed. Afebrile, vital stable,  saturations on room air  No acute events overnight  Denies chest pain, shortness of breath, abdominal pain, nausea/vomiting, urinary/bowel symptoms, fever/chills. He complains of mild achy pain on his left leg  Appreciate vascular surgery recommendation    ROS:  Constitutional:  negative for chills, fevers, sweats  Respiratory:  negative for cough, dyspnea on exertion, hemoptysis, shortness of breath, wheezing  Cardiovascular:  negative for chest pain, chest pressure/discomfort, lower extremity edema, palpitations  Gastrointestinal:  negative for abdominal pain, constipation, diarrhea, nausea, vomiting  Neurological:  negative for dizziness, headache  BRIEF HISTORY     68 y.o. male presents with a chief complaint of left leg swelling and pain     Past medical history significant for CHF, CAD status post stents, liver cirrhosis, history of hepatocellular carcinoma, history of bladder cancer, Parkinson's disease, hypertension, type II diabetes mellitus, hearing loss. Patient reports that his left leg swelling has been ongoing for the past few days, it has been gradually worsening, it started as a small red area which progressively expanded. Denies any fevers, nausea/vomiting, chest pain, shortness of breath, abdominal pain or diarrhea. He denies any provoking factor, recent travel, or any recent injury or trauma.   He reports that the pain is 2/3 out of 10. At the ED, venous Doppler was done, which showed patient has extensive DVT starting at the distal greater saphenous vein going to the saphenous femoral junction. WBCs were 12.4. OBJECTIVE     Vital Signs:  /61   Pulse 92   Temp 96.9 °F (36.1 °C)   Resp 16   Ht 5' 11\" (1.803 m)   Wt 200 lb (90.7 kg)   SpO2 90%   BMI 27.89 kg/m²     Temp (24hrs), Av.9 °F (36.1 °C), Min:96.9 °F (36.1 °C), Max:96.9 °F (36.1 °C)    In: 20   Out: -     Physical Exam:  Constitutional: This is a well developed, well nourished, 25-29.9 - Overweight 68y.o. year old male who is alert, oriented, cooperative and in no apparent distress. Head:normocephalic and atraumatic. Respiratory:  Breath sounds bilaterally were clear to auscultation. There were no wheezes, rhonchi or rales. There is no intercostal retraction or use of accessory muscles. Cardiovascular: Regular without murmur, clicks, gallops or rubs. Abdomen: Slightly rounded and soft without organomegaly. No rebound, rigidity or guarding was appreciated. Musculoskeletal: Normal curvature of the spine. No gross muscle weakness. Extremities: Redness, swelling and warmth on the lower left leg. Muscle size, tone and strength are normal.  No involuntary movements are noted. Skin:  Warm and dry. Good color, turgor and pigmentation. No lesions or scars.   No cyanosis or clubbing  Neurological/Psychiatric: The patient's general behavior, level of consciousness, thought content and emotional status is normal.        Medications:  Scheduled Medications:    cephALEXin  500 mg Oral 4x daily    sodium chloride flush  5-40 mL IntraVENous 2 times per day    insulin lispro  0-4 Units SubCUTAneous TID WC    insulin lispro  0-4 Units SubCUTAneous Nightly    aspirin  81 mg Oral Daily    furosemide  40 mg Oral QAM    doxazosin  4 mg Oral Daily     Continuous Infusions:    heparin (PORCINE) Infusion 10 Units/kg/hr (22 8083) sodium chloride      dextrose       PRN Medicationsheparin (porcine), 80 Units/kg, PRN  heparin (porcine), 40 Units/kg, PRN  sodium chloride flush, 5-40 mL, PRN  sodium chloride, , PRN  ondansetron, 4 mg, Q8H PRN   Or  ondansetron, 4 mg, Q6H PRN  polyethylene glycol, 17 g, Daily PRN  acetaminophen, 650 mg, Q6H PRN   Or  acetaminophen, 650 mg, Q6H PRN  glucose, 4 tablet, PRN  dextrose bolus, 125 mL, PRN   Or  dextrose bolus, 250 mL, PRN  glucagon (rDNA), 1 mg, PRN  dextrose, , Continuous PRN        Diagnostic Labs:  CBC:   Recent Labs     12/21/22  1533   WBC 12.4*   RBC 4.41   HGB 12.9*   HCT 43.4   MCV 98.4   RDW 15.1*        BMP:   Recent Labs     12/21/22  1533   *   K 4.2   CL 96*   CO2 26   BUN 22   CREATININE 0.98     BNP: No results for input(s): BNP in the last 72 hours. PT/INR: No results for input(s): PROTIME, INR in the last 72 hours. APTT:   Recent Labs     12/21/22  1859 12/21/22  1916 12/22/22  0442   APTT >120.0* >120.0* >120.0*     CARDIAC ENZYMES: No results for input(s): CKMB, CKMBINDEX, TROPONINI in the last 72 hours. Invalid input(s): CKTOTAL;3  FASTING LIPID PANEL:No results found for: CHOL, HDL, TRIG  LIVER PROFILE: No results for input(s): AST, ALT, ALB, BILIDIR, BILITOT, ALKPHOS in the last 72 hours. MICROBIOLOGY: No results found for: CULTURE    Imaging:    No results found. ASSESSMENT & PLAN     ASSESSMENT / PLAN:     Principal Problem:    DVT, lower extremity, distal, acute, left (HCC)  Active Problems:    Type 2 diabetes mellitus (HCC)    CHF (congestive heart failure) (HCC)    Parkinson's disease ([facility] [address]    Hearing loss    Hypertension    Liver cirrhosis (HCC)    Coronary artery disease    History of hepatocellular carcinoma    History of bladder cancer  Resolved Problems:    * No resolved hospital problems.  *     Acute DVT  -Venous Doppler shows extensive DVT starting in the distal greater saphenous vein going to the saphenofemoral junction  -Vascular surgery consulted, recommended to continue heparin drip, observe for 48 hours if symptoms  worsen may need thrombectomy  -Started on heparin drip     #Concern for cellulitis  - Keflex 500 4 times daily. #CHF  -Resume Lasix  -Takes Lasix and spironolactone at home     #Coronary artery disease status post PCI  -Continue aspirin     #Hypertension. Will resume home meds as needed, takes losartan and Coreg at home     #Type 2 diabetes mellitus. On metformin at home  - Insulin low-dose sliding scale  - Hypoglycemia protocol in place  -Continue to monitor blood glucose levels     #BPH. On doxazosin     #Parkinson's disease. Not on any medication, has established outpatient neurology. DVT ppx : Heparin GTT  GI ppx: Not indicated    PT/OT: Consulted  Discharge Planning / SW:  consulted    Emmanuel Paz MD  Internal Medicine Resident, PGY-1  [facility];  [place], [place]  12/22/2022, 7:04 AM

## 2022-12-22 NOTE — ED NOTES
Report given to [facility]  No change in patient status  Continues to rest quietly     Luis Maldonado, [address]  12/22/22 5834

## 2022-12-22 NOTE — PROGRESS NOTES
Internal Medicine Teaching Service Senior Note      This is a 68 y.o. male admitted 12/21/2022 for DVT, lower extremity, distal, acute, left ([facility] [address] [I82.4Z2]. Patient came in with Chief complaint of   Chief Complaint   Patient presents with    Leg Swelling     Redness, pt believes it may be a DVT       See H&P of admitting/intern resident for more details. 20-year-old male with past medical history of congestive heart failure, type 2 diabetes mellitus, Parkinson's disease, bladder cancer, history of hepatocellular cancer, coronary artery disease and liver cirrhosis presented with chief complaints of left lower extremity swelling. [facility] and currently we have limited records. Patient states that he started developing left lower extremity redness a week ago which progressed and was associated with left lower extremity swelling. Patient denies any trauma, insect bites, tick bites. Patient states that she does not take any blood thinners and does not have any history of blood clots, strokes. On evaluation patient was found to have an extensive DVT of common femoral, popliteal, peroneal and gastrinomas vein. Patient denies any complaints of fever/chills/chest pain/shortness of breath, any weakness or numbness in lower extremities.       ER Course  Vitals:    12/21/22 1444   BP: 137/73   Pulse: 92   Resp: 16   Temp: 96.9 °F (36.1 °C)   SpO2: 97%       BMP:   Recent Labs     12/21/22  1533   *   K 4.2   CL 96*   CO2 26   BUN 22   CREATININE 0.98   GLUCOSE 247*     CBC: )  Recent Labs     12/21/22  1533   WBC 12.4*   HGB 12.9*   HCT 43.4             Assessment    Principal Problem:    DVT, lower extremity, distal, acute, left (HCC)  Active Problems:    Type 2 diabetes mellitus (HCC)    CHF (congestive heart failure) (HCC)    Parkinson's disease ([facility] [address]    Hearing loss    Hypertension    Liver cirrhosis (HCC)    Coronary artery disease    History of hepatocellular carcinoma    History of bladder cancer  Resolved Problems:    * No resolved hospital problems. *        Plan     -We will start the patient on heparin drip for treatment of lower extremity DVT  -Consult vascular surgery due to extensive DVT to see if patient requires further intervention. Current recommendations to continue heparin drip for 48 hours  -Patient started on Keflex 500 mg 4 times daily for suspicion of cellulitis  -Start the patient on low-dose sliding scale to manage hyperglycemia given patient's underlying history of type 2 diabetes mellitus  -Restart home dose of Lasix 40 mg daily  -Restart home dose of Cardura 4 mg substituted for terazosin. Phillip Hardwick MD  Internal Medicine Resident, PGY-3  [address], [place]  12/21/2022,8:21 PM

## 2022-12-22 NOTE — CONSULTS
Division of Vascular Surgery          Vascular Consult      Name: Kayleigh Sánchez  MRN: [id number]       Physician Requesting Consult:  Dr. Porsha Sevilla    Reason for Consult:   Left Lower extremity DVT    Chief Complaint:      Left leg swelling and pain    History of Present Illness:      Kayleigh Sánchez is a 68 y.o.  male with history of CAD, Parkinson disease, HTN, Cirrhosis, Bladder cancer (surgically removed) who presents with complain of pain, redness and swelling in the left leg for past 1 week. Patient was taking pain medications for the same but it was not resolving. Point in the last 1 week the redness has increased to involve the lower half of left leg along with medial side of the leg. The swelling has increased from ankle to involve the leg below the knee. Patient underwent an ultrasound Doppler to evaluate for DVT which showed great saphenous vein DVT extending to the saphenofemoral junction involving the common femoral vein. Patient does not have any weakness in the left lower extremity. He has difficulty walking at baseline. Past Medical History:     Past Medical History:   Diagnosis Date    Arthritis     CAD (coronary artery disease)     Cancer ([facility] [address]     bladder, face ,    Cirrhosis ([facility] [address]     Diabetes mellitus ([facility] [address]     Hypertension     Parkinson disease ([facility] [address]         Past Surgical History:     Past Surgical History:   Procedure Laterality Date    COCHLEAR IMPLANT Bilateral     CORONARY ANGIOPLASTY WITH STENT PLACEMENT  2003    MOUTH SURGERY  06/2018        Medications Prior to Admission:       Prior to Admission medications    Medication Sig Start Date End Date Taking?  Authorizing Provider   aspirin 81 MG EC tablet Take 81 mg by mouth daily 2/21/17  Yes Historical Provider, MD   carvedilol (COREG) 12.5 MG tablet Take 12.5 mg by mouth 2 times daily (with meals) 11/10/22  Yes Historical Provider, MD   DULoxetine (CYMBALTA) 20 MG extended release capsule Take 20 mg by mouth daily 9/26/22  Yes Historical Provider, MD   furosemide (LASIX) 40 MG tablet Take 40 mg by mouth every morning 10/24/22  Yes Historical Provider, MD   losartan (COZAAR) 50 MG tablet Take 50 mg by mouth daily 11/10/22  Yes Historical Provider, MD   spironolactone (ALDACTONE) 25 MG tablet Take 75 mg by mouth every morning 5/5/22  Yes Historical Provider, MD   terazosin (HYTRIN) 2 MG capsule Take 8 mg by mouth nightly 10/12/22  Yes Historical Provider, MD   metFORMIN (GLUCOPHAGE) 1000 MG tablet Take 1,000 mg by mouth 2 times daily (with meals) 8/10/22  Yes Historical Provider, MD   omeprazole (PRILOSEC) 20 MG delayed release capsule Take 20 mg by mouth daily (before dinner) 30 mins before dinner 4/28/22  Yes Historical Provider, MD   potassium chloride (KLOR-CON) 10 MEQ extended release tablet Take 10 mEq by mouth daily TAKE ONE TABLET BY MOUTH EVERY DAY 10/12/22  Yes Historical Provider, MD   hydroxyurea (HYDREA) 500 MG chemo capsule Take 500 mg by mouth daily TAKE ONE CAPSULE BY MOUTH ONCE DAILY AND TAKE ONE CAPSULE EVERY 48 HOURS (EVERY 2 DAYS) IN EVENING 9/1/22  Yes Historical Provider, MD   ibuprofen (ADVIL;MOTRIN) 800 MG tablet Take 1 tablet by mouth every 8 hours as needed for Pain 8/11/18   Reed Gonzales DO        Allergies:       Patient has no known allergies. Social History:     Tobacco:    reports that he has quit smoking. He has never used smokeless tobacco.  Alcohol:      reports no history of alcohol use. Drug Use:  reports no history of drug use. Family History:     No family history on file. Review of Systems:     Positive and Negative as described in HPI    Constitutional:  negative for  fevers, chills, sweats, fatigue, and weight loss.   Resting tremor present  HEENT:  negative for vision or hearing changes,   Respiratory:  negative for shortness of breath, cough, or congestion  Cardiovascular:  negative for  chest pain, palpitations  Gastrointestinal:  negative for nausea, vomiting, diarrhea, constipation, abdominal pain  Genitourinary:  negative for frequency, dysuria  Integument:  negative for rash, skin lesions  Chest/Breast:  No painful inspiration or expiration, no rib sternal pain  Musculoskeletal:  negative for muscle aches or joint pain  Neurological:  negative for headaches, dizziness, lightheadedness, numbness, pain and tingling extremities  Lymphatics: no lymphadenopathy or painful masses  Behavior/Psych:  negative for depression and anxiety    Physical Exam:     Vitals:  /73   Pulse 92   Temp 96.9 °F (36.1 °C)   Resp 16   Ht 5' 11\" (1.803 m)   Wt 200 lb (90.7 kg)   SpO2 97%   BMI 27.89 kg/m²     General appearance - alert, well appearing and in no acute distress  Mental status - oriented to person, place and time with normal affect  Head - normocephalic and atraumatic  Eyes - pupils equal and reactive, extraocular eye movements intact, conjunctiva clear  Ears - hearing appears to be intact  Nose - no drainage noted  Mouth - mucous membranes moist  Neck - supple, no carotid bruits, thyroid not palpable, no JVD  Chest - clear to auscultation, normal effort  Heart - normal rate, regular rhythm, no murmurs  Abdomen - soft, non-tender, non-distended, bowel sounds present all four quadrants, no masses, hepatomegaly, splenomegaly or aortic enlargement  Neurological - normal speech, no focal findings or movement disorder noted, cranial nerves II through XII grossly intact  Extremities - peripheral pulses palpable, left leg edema present along with blanching erythema over the anterior medial side of the leg. Temperature of the skin is raised as compared to the other part of the body. No fluctuation present. No motor weakness present in the left lower extremity as compared to the right. Distal pulses are palpable. No sensory loss present over the left leg. Skin - no gross lesions, rashes, or induration noted             Imaging:   Vascular Doppler -final report still awaited in [facility].   Radiology consultation mention common femoral DVT      Assessment:     Tram Gage is a 68 y.o.  male who presented to ED with complaint of pain redness and swelling of the left leg. On examination signs suggestive of cellulitis are present in the left leg. On imaging DVT of the left lower extremity present. Plan:     Recommend anticoagulation with heparin GTT  Elevate the leg above the heart level  Will plan for mild compression of the left leg after 24 to 48 hours of starting anticoagulation  Will review examination and symptoms for any progression of DVT.   Patient may require thrombectomy if signs and symptoms worsen  Vascular surgery will follow      ----------------------------------------    Michael Ahn MD  PGY 2  Vascular surgery      [address]

## 2022-12-22 NOTE — ED NOTES
The following labs were labeled with appropriate pt sticker and tubed to lab:     [x] Blue     [] Lavender   [] on ice  [] Green/yellow  [] Green/black [] on ice  [] Scherrie Flattery  [] on ice  [] Yellow  [] Red  [] Type/ Screen  [] ABG  [] VBG    [] COVID-19 swab    [] Rapid  [] PCR  [] Flu swab  [] Peds Viral Panel     [] Urine Sample  [] Fecal Sample  [] Pelvic Cultures  [] Blood Cultures  [] X 2  [] STREP Cultures         Rekha Calvin RN  12/21/22 1781

## 2022-12-22 NOTE — ED NOTES
Pt resting on stretcher, attached to monitor, RR even and non labored, call light within reach.       Sherald Kehr, RN  12/22/22 6430

## 2022-12-22 NOTE — PROGRESS NOTES
Division of Vascular Surgery             Progress Note      Name: Candice Nyhan  MRN: [id number]         Overnight Events:     None      Subjective:     Patient seen and examined this morning, no overnight events. Patient reports his pain is well controlled. Tolerating regular diet. On heparin since last night. He has palpable bilateral DP and femoral pulses. VSS, afebrile. Physical Exam:     Vitals:  /68   Pulse 75   Temp 97.3 °F (36.3 °C) (Oral)   Resp 18   Ht 5' 11\" (1.803 m)   Wt 200 lb (90.7 kg)   SpO2 97%   BMI 27.89 kg/m²       General appearance - alert, well appearing and in no acute distress  Mental status - oriented to person, place and time with normal affect  Head - normocephalic and atraumatic  Neck - supple,  no JVD  Chest -unlabored breathing on RA  Heart - normal rate, regular rhythm,   Abdomen - soft, non-tender, non-distended,   Neurological - normal speech, no focal findings or movement disorder noted, cranial nerves II through XII grossly intact  Extremities - peripheral pulses palpable femoral and DP, mild 1+ pitting edema bilaterally. There is mild erythema on the anterior medial aspect of the left foot and upper ankle. Without drainage, discharge, induration, fluctuance. Mildly TTP  Skin - no gross lesions, rashes, or induration noted  Vascular Exam -palpable femoral and DP pulses bilaterally      Imaging:   No results found. Assessment:     77M with left GSV to common femoral DVT on ultrasound      Plan:     Examined patient this morning. Stable exam.  Started on heparin last night. After 24-48 hours of heparin GGT, will consider L LE compression  Monitor LLE. If worsening exam or if patient has increased pain, may need thrombectomy. Continue to monitor  Vascular exam per unit protocol. Currently has palpable femoral and palpable DP pulses  Diet may have a diet today.   Continue medical management per primary. ----------------------------------------  Wayne Som Leopoldo Melody [place]outh: [phone]  C: [phone]

## 2022-12-22 NOTE — H&P
[address]     Department of Internal Medicine - Staff Internal Medicine Teaching Service          ADMISSION NOTE/HISTORY AND PHYSICAL EXAMINATION   Date: 12/21/2022  Patient Name: Mirtha Bran  Date of admission: 12/21/2022  2:58 PM  YOB: 1945  PCP: No primary care provider on file. History Obtained From:  patient    CHIEF COMPLAINT     Chief complaint: Left leg swelling and pain    HISTORY OF PRESENTING ILLNESS     The patient is a pleasant 68 y.o. male presents with a chief complaint of left leg swelling and pain    Past medical history significant for CHF, CAD status post stents, liver cirrhosis, history of hepatocellular carcinoma, history of bladder cancer, Parkinson's disease, hypertension, type II diabetes mellitus, hearing loss. Patient reports that his left leg swelling has been ongoing for the past few days, it has been gradually worsening, it started as a small red area which progressively expanded. Denies any fevers, nausea/vomiting, chest pain, shortness of breath, abdominal pain or diarrhea. He denies any provoking factor, recent travel, or any recent injury or trauma. He reports that the pain is 2/3 out of 10. At the ED, venous Doppler was done, which showed patient has extensive DVT starting at the distal greater saphenous vein going to the saphenous femoral junction. WBCs were 12.4.          Review of Systems:  General ROS: Completed and except as mentioned above were negative   HEENT ROS: Completed and except as mentioned above were negative   Allergy and Immunology ROS:  Completed and except as mentioned above were negative  Hematological and Lymphatic ROS:  Completed and except as mentioned above were negative  Respiratory ROS:  Completed and except as mentioned above were negative  Cardiovascular ROS:  Completed and except as mentioned above were negative  Gastrointestinal ROS: Completed and except as mentioned above were negative  Genito-Urinary ROS:  Completed and except as mentioned above were negative  Musculoskeletal ROS:  Completed and except as mentioned above were negative  Neurological ROS:  Completed and except as mentioned above were negative  Skin & Dermatological ROS:  Completed and except as mentioned above were negative  Psychological ROS:  Completed and except as mentioned above were negative    PAST MEDICAL HISTORY     Past Medical History:   Diagnosis Date    Arthritis     CAD (coronary artery disease)     Cancer ([facility] [address]     bladder, face ,    Cirrhosis ([facility] [address]     Diabetes mellitus ([facility] [address]     Hypertension     Parkinson disease ([facility] [address]        PAST SURGICAL HISTORY     Past Surgical History:   Procedure Laterality Date    COCHLEAR IMPLANT Bilateral     CORONARY ANGIOPLASTY WITH STENT PLACEMENT      MOUTH SURGERY  2018       ALLERGIES     Patient has no known allergies. MEDICATIONS PRIOR TO ADMISSION     Prior to Admission medications    Medication Sig Start Date End Date Taking? Authorizing Provider   ibuprofen (ADVIL;MOTRIN) 800 MG tablet Take 1 tablet by mouth every 8 hours as needed for Pain 18   Ami Randal, DO       SOCIAL HISTORY     Tobacco: Former smoker  Alcohol: Denies use  Illicits: Denies use    FAMILY HISTORY     No family history on file. PHYSICAL EXAM     Vitals: /73   Pulse 92   Temp 96.9 °F (36.1 °C)   Resp 16   Ht 5' 11\" (1.803 m)   Wt 200 lb (90.7 kg)   SpO2 97%   BMI 27.89 kg/m²   Tmax: Temp (24hrs), Av.9 °F (36.1 °C), Min:96.9 °F (36.1 °C), Max:96.9 °F (36.1 °C)    Last Body weight:   Wt Readings from Last 3 Encounters:   22 200 lb (90.7 kg)   18 185 lb (83.9 kg)     Body Mass Index : Body mass index is 27.89 kg/m². PHYSICAL EXAMINATION:  Physical Exam  Constitutional:       General: He is awake. He is not in acute distress. HENT:      Head: Normocephalic and atraumatic. Cardiovascular:      Rate and Rhythm: Normal rate and regular rhythm. Heart sounds: Normal heart sounds. Pulmonary:      Effort: Pulmonary effort is normal. No accessory muscle usage or respiratory distress. Breath sounds: Normal breath sounds. No decreased breath sounds, wheezing or rhonchi. Abdominal:      General: Abdomen is flat and protuberant. There is distension. Musculoskeletal:      Comments: Lower left leg edema, redness, warmth and mild tenderness   Neurological:      General: No focal deficit present. Mental Status: He is alert and oriented to person, place, and time. Motor: Tremor present. Psychiatric:         Attention and Perception: Attention and perception normal.         Mood and Affect: Mood and affect normal.         Speech: Speech normal.         Behavior: Behavior normal. Behavior is cooperative. Thought Content:  Thought content normal.         Cognition and Memory: Cognition normal.         Judgment: Judgment normal.          INVESTIGATIONS     Laboratory Testing:     Recent Results (from the past 24 hour(s))   CBC with Auto Differential    Collection Time: 12/21/22  3:33 PM   Result Value Ref Range    WBC 12.4 (H) 3.5 - 11.3 k/uL    RBC 4.41 4.21 - 5.77 m/uL    Hemoglobin 12.9 (L) 13.0 - 17.0 g/dL    Hematocrit 43.4 40.7 - 50.3 %    MCV 98.4 82.6 - 102.9 fL    MCH 29.3 25.2 - 33.5 pg    MCHC 29.7 28.4 - 34.8 g/dL    RDW 15.1 (H) 11.8 - 14.4 %    Platelets 643 924 - 880 k/uL    MPV 13.1 8.1 - 13.5 fL    NRBC Automated 0.0 0.0 per 100 WBC    Seg Neutrophils 83 (H) 36 - 65 %    Lymphocytes 8 (L) 24 - 43 %    Monocytes 5 3 - 12 %    Eosinophils % 2 1 - 4 %    Basophils 1 0 - 2 %    Immature Granulocytes 1 (H) 0 %    Segs Absolute 10.25 (H) 1.50 - 8.10 k/uL    Absolute Lymph # 0.96 (L) 1.10 - 3.70 k/uL    Absolute Mono # 0.66 0.10 - 1.20 k/uL    Absolute Eos # 0.23 0.00 - 0.44 k/uL    Basophils Absolute 0.14 0.00 - 0.20 k/uL    Absolute Immature Granulocyte 0.18 0.00 - 0.30 k/uL    RBC Morphology ANISOCYTOSIS PRESENT    Basic Metabolic Panel    Collection Time: 12/21/22  3:33 PM   Result Value Ref Range    Glucose 247 (H) 70 - 99 mg/dL    BUN 22 8 - 23 mg/dL    Creatinine 0.98 0.70 - 1.20 mg/dL    Est, Glom Filt Rate >60 >60 mL/min/1.73m2    Calcium 9.1 8.6 - 10.4 mg/dL    Sodium 134 (L) 135 - 144 mmol/L    Potassium 4.2 3.7 - 5.3 mmol/L    Chloride 96 (L) 98 - 107 mmol/L    CO2 26 20 - 31 mmol/L    Anion Gap 12 9 - 17 mmol/L   D-Dimer, Quantitative    Collection Time: 12/21/22  3:33 PM   Result Value Ref Range    D-Dimer, Quant 0.70 mg/L FEU   SPECIMEN REJECTION    Collection Time: 12/21/22  6:36 PM   Result Value Ref Range    Specimen Source . BLOOD     Ordered Test PTT     Reason for Rejection Unable to perform testing: Specimen clotted. Imaging:   No results found. ASSESSMENT & PLAN     ASSESSMENT / PLAN:     Principal Problem:    DVT, lower extremity, distal, acute, left (HCC)  Active Problems:    Type 2 diabetes mellitus (HCC)    CHF (congestive heart failure) (HCC)    Parkinson's disease ([facility] [address]    Hearing loss    Hypertension    Liver cirrhosis (HCC)    Coronary artery disease    History of hepatocellular carcinoma    History of bladder cancer  Resolved Problems:    * No resolved hospital problems. *     #Acute DVT  -Venous Doppler shows extensive DVT starting in the distal greater saphenous vein going to the saphenofemoral junction  -Vascular surgery consulted, recommended to continue heparin drip, observe for 48 hours if symptoms persist or worsen may need thrombectomy  -Started on heparin drip    #Concern for cellulitis  -Received Keflex    #CHF  -Resume Lasix  -Takes Lasix and spironolactone at home    #Coronary artery disease status post PCI  -Continue aspirin    #Hypertension. Will resume home meds as needed, takes losartan and Coreg at home    #Type 2 diabetes mellitus. On metformin at home  - Insulin low-dose sliding scale  - Hypoglycemia protocol in place  -Continue to monitor blood glucose levels    #BPH.   On doxazosin    #Parkinson's disease. Not on any medication, has established outpatient neurology. DVT ppx: Heparin  GI ppx: Not indicated    PT/OT/SW consulted  Discharge Planning:  consulted    Hilary Nicole MD  Internal Medicine Resident, PGY-1  [facility];  [place], [place]  12/21/2022, 7:01 PM

## 2022-12-23 VITALS
SYSTOLIC BLOOD PRESSURE: 145 MMHG | DIASTOLIC BLOOD PRESSURE: 74 MMHG | RESPIRATION RATE: 18 BRPM | OXYGEN SATURATION: 93 % | BODY MASS INDEX: 28 KG/M2 | WEIGHT: 200 LBS | TEMPERATURE: 97.5 F | HEIGHT: 71 IN | HEART RATE: 64 BPM

## 2022-12-23 LAB
ABSOLUTE EOS #: 0.2 K/UL (ref 0–0.44)
ABSOLUTE IMMATURE GRANULOCYTE: 0.19 K/UL (ref 0–0.3)
ABSOLUTE LYMPH #: 1.53 K/UL (ref 1.1–3.7)
ABSOLUTE MONO #: 0.63 K/UL (ref 0.1–1.2)
ANION GAP SERPL CALCULATED.3IONS-SCNC: 10 MMOL/L (ref 9–17)
BASOPHILS # BLD: 1 % (ref 0–2)
BASOPHILS ABSOLUTE: 0.13 K/UL (ref 0–0.2)
BUN BLDV-MCNC: 19 MG/DL (ref 8–23)
CALCIUM SERPL-MCNC: 9.1 MG/DL (ref 8.6–10.4)
CHLORIDE BLD-SCNC: 100 MMOL/L (ref 98–107)
CO2: 23 MMOL/L (ref 20–31)
CREAT SERPL-MCNC: 0.94 MG/DL (ref 0.7–1.2)
EOSINOPHILS RELATIVE PERCENT: 2 % (ref 1–4)
GFR SERPL CREATININE-BSD FRML MDRD: >60 ML/MIN/1.73M2
GLUCOSE BLD-MCNC: 130 MG/DL (ref 70–99)
GLUCOSE BLD-MCNC: 155 MG/DL (ref 75–110)
GLUCOSE BLD-MCNC: 304 MG/DL (ref 75–110)
HCT VFR BLD CALC: 42 % (ref 40.7–50.3)
HEMOGLOBIN: 12.3 G/DL (ref 13–17)
IMMATURE GRANULOCYTES: 2 %
LYMPHOCYTES # BLD: 13 % (ref 24–43)
MCH RBC QN AUTO: 29.3 PG (ref 25.2–33.5)
MCHC RBC AUTO-ENTMCNC: 29.3 G/DL (ref 28.4–34.8)
MCV RBC AUTO: 100 FL (ref 82.6–102.9)
MONOCYTES # BLD: 5 % (ref 3–12)
NRBC AUTOMATED: 0 PER 100 WBC
PARTIAL THROMBOPLASTIN TIME: 50.5 SEC (ref 20.5–30.5)
PARTIAL THROMBOPLASTIN TIME: 59.3 SEC (ref 20.5–30.5)
PDW BLD-RTO: 15.2 % (ref 11.8–14.4)
PLATELET # BLD: 161 K/UL (ref 138–453)
PMV BLD AUTO: 12.8 FL (ref 8.1–13.5)
POTASSIUM SERPL-SCNC: 4.3 MMOL/L (ref 3.7–5.3)
RBC # BLD: 4.2 M/UL (ref 4.21–5.77)
RBC # BLD: ABNORMAL 10*6/UL
SEG NEUTROPHILS: 77 % (ref 36–65)
SEGMENTED NEUTROPHILS ABSOLUTE COUNT: 8.88 K/UL (ref 1.5–8.1)
SODIUM BLD-SCNC: 133 MMOL/L (ref 135–144)
WBC # BLD: 11.6 K/UL (ref 3.5–11.3)

## 2022-12-23 PROCEDURE — 2580000003 HC RX 258

## 2022-12-23 PROCEDURE — 80048 BASIC METABOLIC PNL TOTAL CA: CPT

## 2022-12-23 PROCEDURE — 85025 COMPLETE CBC W/AUTO DIFF WBC: CPT

## 2022-12-23 PROCEDURE — 97530 THERAPEUTIC ACTIVITIES: CPT

## 2022-12-23 PROCEDURE — 6360000002 HC RX W HCPCS: Performed by: STUDENT IN AN ORGANIZED HEALTH CARE EDUCATION/TRAINING PROGRAM

## 2022-12-23 PROCEDURE — 85730 THROMBOPLASTIN TIME PARTIAL: CPT

## 2022-12-23 PROCEDURE — 6370000000 HC RX 637 (ALT 250 FOR IP): Performed by: STUDENT IN AN ORGANIZED HEALTH CARE EDUCATION/TRAINING PROGRAM

## 2022-12-23 PROCEDURE — 94660 CPAP INITIATION&MGMT: CPT

## 2022-12-23 PROCEDURE — 99232 SBSQ HOSP IP/OBS MODERATE 35: CPT | Performed by: STUDENT IN AN ORGANIZED HEALTH CARE EDUCATION/TRAINING PROGRAM

## 2022-12-23 PROCEDURE — 82947 ASSAY GLUCOSE BLOOD QUANT: CPT

## 2022-12-23 PROCEDURE — 36415 COLL VENOUS BLD VENIPUNCTURE: CPT

## 2022-12-23 PROCEDURE — 6370000000 HC RX 637 (ALT 250 FOR IP)

## 2022-12-23 PROCEDURE — 97162 PT EVAL MOD COMPLEX 30 MIN: CPT

## 2022-12-23 RX ADMIN — CARVEDILOL 12.5 MG: 12.5 TABLET, FILM COATED ORAL at 08:58

## 2022-12-23 RX ADMIN — FUROSEMIDE 40 MG: 40 TABLET ORAL at 08:58

## 2022-12-23 RX ADMIN — CIPROFLOXACIN AND DEXAMETHASONE 4 DROP: 3; 1 SUSPENSION/ DROPS AURICULAR (OTIC) at 08:59

## 2022-12-23 RX ADMIN — HEPARIN SODIUM 3630 UNITS: 1000 INJECTION INTRAVENOUS; SUBCUTANEOUS at 05:37

## 2022-12-23 RX ADMIN — ASPIRIN 81 MG: 81 TABLET, COATED ORAL at 09:01

## 2022-12-23 RX ADMIN — PANTOPRAZOLE 20 MG: 20 TABLET, DELAYED RELEASE ORAL at 08:58

## 2022-12-23 RX ADMIN — RIVAROXABAN 15 MG: 15 TABLET, FILM COATED ORAL at 13:17

## 2022-12-23 RX ADMIN — INSULIN LISPRO 3 UNITS: 100 INJECTION, SOLUTION INTRAVENOUS; SUBCUTANEOUS at 11:47

## 2022-12-23 RX ADMIN — DULOXETINE 20 MG: 20 CAPSULE, DELAYED RELEASE ORAL at 08:58

## 2022-12-23 RX ADMIN — CEPHALEXIN 500 MG: 500 CAPSULE ORAL at 08:58

## 2022-12-23 RX ADMIN — LOSARTAN POTASSIUM 50 MG: 50 TABLET, FILM COATED ORAL at 08:58

## 2022-12-23 RX ADMIN — DOXAZOSIN 4 MG: 2 TABLET ORAL at 08:58

## 2022-12-23 RX ADMIN — SODIUM CHLORIDE, PRESERVATIVE FREE 10 ML: 5 INJECTION INTRAVENOUS at 08:58

## 2022-12-23 RX ADMIN — CEPHALEXIN 500 MG: 500 CAPSULE ORAL at 13:16

## 2022-12-23 NOTE — PROGRESS NOTES
CLINICAL PHARMACY NOTE: MEDS TO BEDS    Total # of Prescriptions Filled: 1   The following medications were delivered to the patient:  Xarelto 15mg    Additional Documentation:   No copay- delivered to bedside

## 2022-12-23 NOTE — PROGRESS NOTES
Division of Vascular Surgery             Progress Note      Name: Cheyenne Shipman  MRN: [id number]         Overnight Events:     None      Subjective:     Patient seen and examined this morning, no overnight events. Patient reports his pain has resolved. Tolerating regular diet. On heparin since last night. He has palpable bilateral DP and femoral pulses. VSS, afebrile. Physical Exam:     Vitals:  /68   Pulse 69   Temp 97.5 °F (36.4 °C) (Axillary)   Resp 19   Ht 5' 11\" (1.803 m)   Wt 200 lb (90.7 kg)   SpO2 98%   BMI 27.89 kg/m²       General appearance - alert, well appearing and in no acute distress  Mental status - oriented to person, place and time with normal affect  Head - normocephalic and atraumatic  Neck - supple,  no JVD  Chest -unlabored breathing on RA  Heart - normal rate, regular rhythm,   Abdomen - soft, non-tender, non-distended,   Neurological - normal speech, no focal findings or movement disorder noted, cranial nerves II through XII grossly intact  Extremities - peripheral pulses palpable femoral and DP, mild 1+ pitting edema bilaterally. There is mild erythema on the anterior medial aspect of the left foot and upper ankle. Without drainage, discharge, induration, fluctuance. Mildly TTP  Skin - no gross lesions, rashes, or induration noted  Vascular Exam -palpable femoral and DP pulses bilaterally      Imaging:   No results found. Assessment:     77M with left GSV to common femoral DVT on ultrasound      Plan:     Examined patient this morning. Stable exam.  Continue on heparin drip  Recommend LLE compression with KIMBERLY hose  Vascular exam per unit protocol. Currently has palpable femoral and palpable DP pulses  Diet: Regular  Vascular surgery to sign at this time. Call with any questions. Continue medical management per primary. ----------------------------------------  Wayne Som Leopoldo Melody [facility] [address] [facility] [place]  O: [phone]  C: [phone]

## 2022-12-23 NOTE — CARE COORDINATION
Case Management Assessment  Initial Evaluation    Date/Time of Evaluation: 12/23/2022 1:14 PM  Assessment Completed by: Cristina Tom RN    If patient is discharged prior to next notation, then this note serves as note for discharge by case management. Patient Name: Candice Nyhan                   YOB: 1945  Diagnosis: Cellulitis of left lower extremity [L03.116]  DVT, lower extremity, distal, acute, left ([facility]) [I82.4Z2]  Acute deep vein thrombosis (DVT) of left lower extremity, unspecified vein ([facility] [address] [I82.402]                   Date / Time: 12/21/2022  2:58 PM    Patient Admission Status: Inpatient   Readmission Risk (Low < 19, Mod (19-27), High > 27): Readmission Risk Score: 10    Current PCP: No primary care provider on file. PCP verified by CM? Yes    Chart Reviewed: Yes      History Provided by: Patient  Patient Orientation: Alert and Oriented    Patient Cognition: Alert    Hospitalization in the last 30 days (Readmission):  No    If yes, Readmission Assessment in  Navigator will be completed. Advance Directives:      Code Status: Full Code   Patient's Primary Decision Maker is: Legal Next of Kin      Discharge Planning:    Patient lives with: Spouse/Significant Other Type of Home: House  Primary Care Giver: Self  Patient Support Systems include: Spouse/Significant Other, Children   Current Financial resources:    Current community resources:    Current services prior to admission: VA, Durable Medical Equipment            Current DME: Glucometer, Cpap, Home Aerosol            Type of Home Care services:  [place]    ADLS  Prior functional level: Independent in ADLs/IADLs  Current functional level: Independent in ADLs/IADLs    PT AM-PAC: 19 /24  OT AM-PAC:   /24    Family can provide assistance at DC: Yes  Would you like Case Management to discuss the discharge plan with any other family members/significant others, and if so, who?  Yes  Plans to Return to Present Housing: Yes  Other Identified Issues/Barriers to RETURNING to current housing:   Potential Assistance needed at discharge: N/A            Potential DME:    Patient expects to discharge to: [facility] for transportation at discharge: Other (see comment) (his son, Cristina Walker)    Financial    Payor: [employer] / Plan: [employer] / Product Type: *No Product type* /     Does insurance require precert for SNF: Yes    Potential assistance Purchasing Medications: No  Meds-to-Beds request: Yes      [address] [phone] Kyrie Emmanuel [phone]  [address] [place] [address]  Phone: [phone] Fax: [phone]      Notes:    Factors facilitating achievement of predicted outcomes: Family support    Barriers to discharge: Decreased endurance    Additional Case Management Notes: The Plan for Transition of Care is related to the following treatment goals of Cellulitis of left lower extremity [L03.116]  DVT, lower extremity, distal, acute, left (HCC) [I82.4Z2]  Acute deep vein thrombosis (DVT) of left lower extremity, unspecified vein ([facility] [address] [C94.561]    IF APPLICABLE: The Patient and/or patient representative Agustina Pritchard and his family were provided with a choice of provider and agrees with the discharge plan. Freedom of choice list with basic dialogue that supports the patient's individualized plan of care/goals and shares the quality data associated with the providers was provided to:     Patient Representative Name:       The Patient and/or Patient Representative Agree with the Discharge Plan? Pt has tripp hose on and when sitting pt up on side of bed, he said he couldn't get  with them on. Notified unit RN, who will place non-slip socks on him.        Buster Peña RN  Case Management Department  Ph: [phone] Fax:

## 2022-12-23 NOTE — PROGRESS NOTES
[facility]  Internal Medicine Teaching Residency Program  Inpatient Daily Progress Note  ______________________________________________________________________________    Patient: Socorro Henderson  YOB: 1945   FCW:[id number]    Acct: [de-identified]     Room: 80/0-65  Admit date: 12/21/2022  Today's date: 12/23/22  Number of days in the hospital: 2    SUBJECTIVE   Admitting Diagnosis: Acute deep vein thrombosis (DVT) of left lower extremity (HCC)  CC: Left leg swelling and pain    Pt examined at bedside. Chart & results reviewed. Afebrile, vital stable,  saturations on room air, Used BiPAP overnight  No acute events overnight  Denies chest pain, shortness of breath, abdominal pain, nausea/vomiting, urinary/bowel symptoms, fever/chills. He reports that pain has resolved  Tolerating regular diet  Appreciate vascular surgery recommendation    ROS:  Constitutional:  negative for chills, fevers, sweats  Respiratory:  negative for cough, dyspnea on exertion, hemoptysis, shortness of breath, wheezing  Cardiovascular:  negative for chest pain, chest pressure/discomfort, lower extremity edema, palpitations  Gastrointestinal:  negative for abdominal pain, constipation, diarrhea, nausea, vomiting  Neurological:  negative for dizziness, headache  BRIEF HISTORY     68 y.o. male presents with a chief complaint of left leg swelling and pain     Past medical history significant for CHF, CAD status post stents, liver cirrhosis, history of hepatocellular carcinoma, history of bladder cancer, Parkinson's disease, hypertension, type II diabetes mellitus, hearing loss. Patient reports that his left leg swelling has been ongoing for the past few days, it has been gradually worsening, it started as a small red area which progressively expanded. Denies any fevers, nausea/vomiting, chest pain, shortness of breath, abdominal pain or diarrhea.   He denies any provoking factor, recent travel, or any recent injury or trauma. He reports that the pain is 2/3 out of 10. At the ED, venous Doppler was done, which showed patient has extensive DVT starting at the distal greater saphenous vein going to the saphenous femoral junction. WBCs were 12.4. OBJECTIVE     Vital Signs:  BP (!) 145/74   Pulse 64   Temp 97.5 °F (36.4 °C) (Oral)   Resp 18   Ht 5' 11\" (1.803 m)   Wt 200 lb (90.7 kg)   SpO2 93%   BMI 27.89 kg/m²     Temp (24hrs), Av.5 °F (36.4 °C), Min:97.2 °F (36.2 °C), Max:97.9 °F (36.6 °C)    In: 210   Out: 1625 [Urine:1625]    Physical Exam:  Constitutional: This is a well developed, well nourished, 25-29.9 - Overweight 68y.o. year old male who is alert, oriented, cooperative and in no apparent distress. Head:normocephalic and atraumatic. Respiratory:  Breath sounds bilaterally were clear to auscultation. There were no wheezes, rhonchi or rales. There is no intercostal retraction or use of accessory muscles. Cardiovascular: Regular without murmur, clicks, gallops or rubs. Abdomen: Slightly rounded and soft without organomegaly. No rebound, rigidity or guarding was appreciated. Musculoskeletal: Normal curvature of the spine. No gross muscle weakness. Extremities: Redness, swelling and warmth on the lower left leg. Muscle size, tone and strength are normal.  No involuntary movements are noted. Skin:  Warm and dry. Good color, turgor and pigmentation. No lesions or scars.   No cyanosis or clubbing  Neurological/Psychiatric: The patient's general behavior, level of consciousness, thought content and emotional status is normal.        Medications:  Scheduled Medications:    ciprofloxacin-dexamethasone  4 drop Right Ear BID    carvedilol  12.5 mg Oral BID WC    DULoxetine  20 mg Oral Daily    pantoprazole  20 mg Oral QAM AC    losartan  50 mg Oral Daily    insulin lispro  0-4 Units SubCUTAneous TID WC    insulin lispro  0-4 Units SubCUTAneous Nightly cephALEXin  500 mg Oral 4x daily    sodium chloride flush  5-40 mL IntraVENous 2 times per day    aspirin  81 mg Oral Daily    furosemide  40 mg Oral QAM    doxazosin  4 mg Oral Daily     Continuous Infusions:    dextrose      heparin (PORCINE) Infusion 16 Units/kg/hr (12/23/22 0536)    sodium chloride       PRN Medicationsalbuterol sulfate HFA, 2 puff, Q6H PRN  glucose, 4 tablet, PRN  dextrose bolus, 125 mL, PRN   Or  dextrose bolus, 250 mL, PRN  glucagon (rDNA), 1 mg, PRN  dextrose, , Continuous PRN  heparin (porcine), 80 Units/kg, PRN  heparin (porcine), 40 Units/kg, PRN  sodium chloride flush, 5-40 mL, PRN  sodium chloride, , PRN  ondansetron, 4 mg, Q8H PRN   Or  ondansetron, 4 mg, Q6H PRN  polyethylene glycol, 17 g, Daily PRN  acetaminophen, 650 mg, Q6H PRN   Or  acetaminophen, 650 mg, Q6H PRN      Diagnostic Labs:  CBC:   Recent Labs     12/21/22  1533 12/23/22  0413   WBC 12.4* 11.6*   RBC 4.41 4.20*   HGB 12.9* 12.3*   HCT 43.4 42.0   MCV 98.4 100.0   RDW 15.1* 15.2*    161     BMP:   Recent Labs     12/21/22  1533 12/23/22  0413   * 133*   K 4.2 4.3   CL 96* 100   CO2 26 23   BUN 22 19   CREATININE 0.98 0.94     BNP: No results for input(s): BNP in the last 72 hours. PT/INR: No results for input(s): PROTIME, INR in the last 72 hours. APTT:   Recent Labs     12/22/22  0442 12/22/22  2030 12/23/22  0413   APTT >120.0* 27.6 50.5*     CARDIAC ENZYMES: No results for input(s): CKMB, CKMBINDEX, TROPONINI in the last 72 hours. Invalid input(s): CKTOTAL;3  FASTING LIPID PANEL:No results found for: CHOL, HDL, TRIG  LIVER PROFILE: No results for input(s): AST, ALT, ALB, BILIDIR, BILITOT, ALKPHOS in the last 72 hours. MICROBIOLOGY: No results found for: CULTURE    Imaging:    No results found.     ASSESSMENT & PLAN     ASSESSMENT / PLAN:     Principal Problem:    Acute deep vein thrombosis (DVT) of left lower extremity (HCC)  Active Problems:    Type 2 diabetes mellitus (HCC)    CHF (congestive heart failure) (HCC)    Parkinson's disease ([facility] [address]    Hearing loss    Hypertension    Liver cirrhosis ([facility] [address]    Coronary artery disease    History of hepatocellular carcinoma    History of bladder cancer  Resolved Problems:    * No resolved hospital problems. *     Acute DVT  -Venous Doppler shows extensive DVT starting in the distal greater saphenous vein going to the saphenofemoral junction  -Vascular surgery consulted, recommended to continue heparin drip, observe for 48 hours if symptoms  worsen may need thrombectomy  -On heparin drip  - Will switch to DOACs on discharge     #Concern for cellulitis  - Keflex 500 4 times daily. #CHF  -Resume Lasix  -Takes Lasix and spironolactone at home     #Coronary artery disease status post PCI  -Continue aspirin     #Hypertension. Will resume home meds as needed, takes losartan and Coreg at home     #Type 2 diabetes mellitus. On metformin at home  - Insulin low-dose sliding scale  - Hypoglycemia protocol in place  -Continue to monitor blood glucose levels     #BPH. On doxazosin     #Parkinson's disease. Not on any medication, has established outpatient neurology. DVT ppx : Heparin GTT  GI ppx: Not indicated    PT/OT: Consulted  Discharge Planning / SW:  consulted    Linda Ding MD  Internal Medicine Resident, PGY-1  [facility];  [place], [place]  12/23/2022, 11:10 AM

## 2022-12-23 NOTE — PROGRESS NOTES
Physical Therapy  [facility] Initial Assessment    Name: Juhi Augustin  : 1945  MRN: [id number]  Date of Service: 2022    Discharge Recommendations: Further therapy recommended at discharge. Chief Complaint   Patient presents with    Leg Swelling     Redness, pt believes it may be a DVT         68 y.o. male presents with a chief complaint of left leg swelling and pain     Past medical history significant for CHF, CAD status post stents, liver cirrhosis, history of hepatocellular carcinoma, history of bladder cancer, Parkinson's disease, hypertension, type II diabetes mellitus, hearing loss. PT Equipment Recommendations  Equipment Needed: No (owns cane)      Patient Diagnosis(es): The primary encounter diagnosis was Acute deep vein thrombosis (DVT) of left lower extremity, unspecified vein ([facility] [address] A diagnosis of Cellulitis of left lower extremity was also pertinent to this visit. Past Medical History:  has a past medical history of Arthritis, CAD (coronary artery disease), Cancer ([address] Cirrhosis ([address] Diabetes mellitus ([address] Hypertension, and Parkinson disease ([address] Past Surgical History:  has a past surgical history that includes Coronary angioplasty with stent (); Mouth surgery (2018); and Cochlear implant (Bilateral). Assessment   Body Structures, Functions, Activity Limitations Requiring Skilled Therapeutic Intervention: Decreased functional mobility ; Decreased ADL status; Decreased body mechanics; Decreased ROM; Decreased endurance;Decreased balance;Decreased coordination;Decreased strength  Assessment: Pt ambulates 120ft with cane and CGA. pt should be safe to return to prior living situation with intermittent support as needed. pt would benefit from continued therapy to promote endurance, balance, and strengthening.   Therapy Prognosis: Good  Decision Making: Medium Complexity  Barriers to Learning: none  Requires PT Follow-Up: Yes  Activity Tolerance  Activity Tolerance: Patient limited by fatigue;Patient limited by endurance     Plan   Physcial Therapy Plan  General Plan:  (5-6x)  Current Treatment Recommendations: Strengthening, Balance training, Functional mobility training, Transfer training, ADL/Self-care training, IADL training, Gait training, Endurance training, Neuromuscular re-education, Equipment evaluation, education, & procurement, Therapeutic activities, Home exercise program, Safety education & training, Patient/Caregiver education & training  Safety Devices  Type of Devices: All fall risk precautions in place, Call light within reach, Left in bed, Gait belt, Nurse notified, Bed alarm in place  Restraints  Restraints Initially in Place: No     Restrictions  Restrictions/Precautions  Restrictions/Precautions: Up as Tolerated  Required Braces or Orthoses?: No     Subjective   General  Patient assessed for rehabilitation services?: Yes  Response To Previous Treatment: Not applicable  Family / Caregiver Present: No  Follows Commands: Within Functional Limits  Other (Comment): Samish, but with B hearing aids  Subjective  Subjective: RN and pt agreeable to PT. pt agreeable and pleasant. Pt supine in bed at start of session, c/o no pain.          Social/Functional History  Social/Functional History  Lives With: Spouse  Type of Home: House  Home Layout: Two level, Bed/Bath upstairs (RHR to second floor)  Home Access: Stairs to enter with rails  Entrance Stairs - Number of Steps: 3  Entrance Stairs - Rails: Right  Bathroom Shower/Tub: Tub/Shower unit  Bathroom Toilet: Handicap height  Bathroom Equipment: Grab bars in shower, Grab bars around toilet, Shower chair  Home Equipment: Lior Ponto, Walker, rolling (Used cane for most mobility, RW for longer distances)  Receives Help From: Family  ADL Assistance: [address] [place] [address] Avenue: Independent  Homemaking Responsibilities: Yes  Active : Yes  Mode of Transportation: SUV  Occupation: Retired  Leisure & Hobbies: TV and walk  Additional Comments: Wife can provide 24 hr support at d/c. Vision/Hearing  Vision  Vision: Impaired  Vision Exceptions: Wears glasses at all times  Hearing  Hearing: Exceptions to [facility]  Hearing Exceptions: Hard of hearing/hearing concerns;Bilateral hearing aid    Cognition   Orientation  Overall Orientation Status: Within Functional Limits  Cognition  Overall Cognitive Status: WFL             Gross Assessment  Sensation: Intact (pt denies n/t)     AROM RLE (degrees)  RLE AROM: Exceptions  RLE General AROM: pt lacking approx. 5 deg knee extension AROM, otherwise WFL  AROM LLE (degrees)  LLE AROM : Exceptions  LLE General AROM: pt lacking approx. 5 deg knee extension AROM, otherwise WFL  AROM RUE (degrees)  RUE AROM : WFL  AROM LUE (degrees)  LUE AROM : WFL  Strength RLE  Strength RLE: WFL  Strength LLE  Strength LLE: WFL  Strength RUE  Strength RUE: WFL  Strength LUE  Strength LUE: WFL           Bed mobility  Supine to Sit: Stand by assistance  Sit to Supine: Stand by assistance  Scooting: Stand by assistance  Bed Mobility Comments: HOB elevated  Transfers  Sit to Stand: Contact guard assistance  Stand to Sit: Contact guard assistance  Comment: cane used for transfer  Ambulation  Surface: Level tile  Device:  (pt's own personal cane with four point base)  Assistance: Contact guard assistance  Gait Deviations: Slow Keri;Decreased step length;Decreased step height  Distance: 120 ft  Comments: No LOB noted.   More Ambulation?: No  Stairs/Curb  Stairs?: No     Balance  Posture: Good  Sitting - Static: Good  Sitting - Dynamic: Good;-  Standing - Static: Fair  Standing - Dynamic: Fair  Comments: assessed with cane                                                           AM-PAC Score  AM-PAC Inpatient Mobility Raw Score : 19 (12/23/22 1217)  AM-PAC Inpatient T-Scale Score : 45.44 (12/23/22 1217)  Mobility Inpatient CMS 0-100% Score: 41.77 (12/23/22 1217)  Mobility Inpatient CMS G-Code Modifier : CK (12/23/22 1217)             Goals  Short Term Goals  Time Frame for Short Term Goals: 14 visits  Short Term Goal 1: Complete transfers with cane and mod I  Short Term Goal 2: Complete 300 ft of gait with cane and mod I  Short Term Goal 3: Complete 3 steps with RHR and mod I  Short Term Goal 4: Participate in 30 minutes of therapy to promote endurance       Education  Patient Education  Education Given To: Patient  Education Provided: Role of Therapy;Plan of Care  Education Method: Demonstration;Verbal  Barriers to Learning: None  Education Outcome: Verbalized understanding;Demonstrated understanding      Therapy Time   Individual Concurrent Group Co-treatment   Time In 0831         Time Out 0851         Minutes 20         Timed Code Treatment Minutes: Vishal Haywood PT

## 2022-12-23 NOTE — DISCHARGE INSTRUCTIONS
Please start taking Xarelto 15 mg two times daily for 21 days then Xarelto 20 mg once daily  Follow-up with your PCP within 1 month at [facility]  Follow-up with Vascular Surgery within 1 month  Please come to ER if symptoms worsen

## 2022-12-25 NOTE — DISCHARGE SUMMARY
[address]     Department of Internal Medicine - Staff Internal Medicine Teaching Service    INPATIENT DISCHARGE SUMMARY      Patient Identification:  Mirtha Bran is a 68 y.o. male. :  1945  MRN: [id number]     Acct: [de-identified]   PCP: No primary care provider on file. Admit Date:  2022  Discharge date and time: 2022  4:28 PM   Attending Provider: No att. providers found                                     [facility] Problem Lists:  Principal Problem:    Acute deep vein thrombosis (DVT) of left lower extremity (HCC)  Active Problems:    Type 2 diabetes mellitus (HCC)    CHF (congestive heart failure) (HCC)    Parkinson's disease (HCC)    Hearing loss    Hypertension    Liver cirrhosis (HCC)    Coronary artery disease    History of hepatocellular carcinoma    History of bladder cancer  Resolved Problems:    * No resolved hospital problems. *      HOSPITAL STAY     Brief Inpatient course:   Mirtha Bran is a 68 y.o. male who was admitted for the management of Acute deep vein thrombosis (DVT) of left lower extremity ([address] presented to the emergency department with left leg swelling and pain    Past medical history significant for CHF, CAD status post stents, liver cirrhosis, history of hepatocellular carcinoma, history of bladder cancer, Parkinson's disease, hypertension, type II DM, hearing loss. He came to the ED complaining of left leg swelling ongoing for the past few days. Venous Dopplers was done which showed extensive DVT starting at the distal greater saphenous vein going to the saphenous femoral junction. Patient was started on heparin drip, vascular surgery was consulted, they recommended to continue heparin drip for 48 hours, and surgical intervention is not necessary at this point of time. He was eventually placed on Xarelto on discharge.     He remained stable, all his medications were optimized and he was discharged once medically cleared as per instructions given below. Procedures/ Significant Interventions:        Consults:     Consults:     Final Specialist Recommendations/Findings:   IP CONSULT TO HOSPITALIST  IP CONSULT TO VASCULAR SURGERY  IP CONSULT TO CASE MANAGEMENT      Any Hospital Acquired Infections: none    Discharge Functional Status:  stable    DISCHARGE PLAN     Disposition: home    Patient Instructions:   Discharge Medication List as of 12/23/2022  3:35 PM        START taking these medications    Details   !! rivaroxaban (XARELTO) 15 MG TABS tablet Take 1 tablet by mouth 2 times daily (with meals) for 42 doses, Disp-42 tablet, R-0Normal      !! rivaroxaban (XARELTO) 20 MG TABS tablet Take 1 tablet by mouth Daily with supper, Disp-69 tablet, R-0Normal       !! - Potential duplicate medications found. Please discuss with provider. CONTINUE these medications which have NOT CHANGED    Details   ciprofloxacin-dexamethasone (CIPRODEX) 0.3-0.1 % otic suspension Place 4 drops into the right ear 2 times dailyHistorical Med      lidocaine (XYLOCAINE) 5 % ointment Apply 4 application topically 4 times daily, Topical, 4 TIMES DAILY Starting Wed 3/9/2022, Historical Med      clobetasol (TEMOVATE) 0.05 % ointment Apply 1 each topically 2 times daily Apply topically 2 times daily. , Topical, 2 TIMES DAILY, Historical Med      mupirocin (BACTROBAN) 2 % ointment Apply topically 2 times daily Apply topically 2 times daily. , Topical, 2 TIMES DAILY, Historical Med      albuterol sulfate HFA (VENTOLIN HFA) 108 (90 Base) MCG/ACT inhaler Inhale 2 puffs into the lungs every 6 hours as needed for Shortness of Breath 2 puffs as neededHistorical Med      potassium chloride (KLOR-CON 10) 10 MEQ extended release tablet Take 10 mEq by mouth dailyHistorical Med      aspirin 81 MG EC tablet Take 81 mg by mouth dailyHistorical Med      carvedilol (COREG) 12.5 MG tablet Take 12.5 mg by mouth 2 times daily (with meals)Historical Med DULoxetine (CYMBALTA) 20 MG extended release capsule Take 20 mg by mouth dailyHistorical Med      furosemide (LASIX) 40 MG tablet Take 40 mg by mouth every morningHistorical Med      losartan (COZAAR) 50 MG tablet Take 50 mg by mouth dailyHistorical Med      spironolactone (ALDACTONE) 25 MG tablet Take 75 mg by mouth every morningHistorical Med      terazosin (HYTRIN) 2 MG capsule Take 8 mg by mouth nightlyHistorical Med      metFORMIN (GLUCOPHAGE) 1000 MG tablet Take 1,000 mg by mouth 2 times daily (with meals)Historical Med      omeprazole (PRILOSEC) 20 MG delayed release capsule Take 20 mg by mouth daily (before dinner) 30 mins before dinnerHistorical Med      hydroxyurea (HYDREA) 500 MG chemo capsule Take 500 mg by mouth daily TAKE ONE CAPSULE BY MOUTH ONCE DAILY AND TAKE ONE CAPSULE EVERY 48 HOURS (EVERY 2 DAYS) IN EVENINGHistorical Med           STOP taking these medications       cephALEXin (KEFLEX) 500 MG capsule Comments:   Reason for Stopping:         prednisoLONE acetate (PRED FORTE) 1 % ophthalmic suspension Comments:   Reason for Stopping:         ibuprofen (ADVIL;MOTRIN) 800 MG tablet Comments:   Reason for Stopping:               Activity: activity as tolerated    Diet: regular diet    Follow-up:    Devorah Trujillo MD  [place] [id number]  [place] [id number]  [phone]    Follow up in 1 month(s)        Patient Instructions:     Please start taking Xarelto 15 mg two times daily for 21 days then Xarelto 20 mg once daily  Follow-up with your PCP within 1 month at [facility]  Follow-up with Vascular Surgery within 1 month  Please come to ER if symptoms worsen        Kayy Zavala MD, MD  Internal Medicine Resident, PGY-1  [place], [place]  12/25/2022, 10:41 AM

## 2023-02-23 ENCOUNTER — APPOINTMENT (OUTPATIENT)
Dept: GENERAL RADIOLOGY | Age: 78
End: 2023-02-23
Payer: OTHER GOVERNMENT

## 2023-02-23 ENCOUNTER — HOSPITAL ENCOUNTER (EMERGENCY)
Age: 78
Discharge: HOME | End: 2023-02-23
Attending: EMERGENCY MEDICINE
Payer: OTHER GOVERNMENT

## 2023-02-23 VITALS
HEART RATE: 83 BPM | OXYGEN SATURATION: 97 % | RESPIRATION RATE: 18 BRPM | DIASTOLIC BLOOD PRESSURE: 65 MMHG | SYSTOLIC BLOOD PRESSURE: 122 MMHG | TEMPERATURE: 97.2 F

## 2023-02-23 DIAGNOSIS — L03.116 CELLULITIS OF LEFT LOWER EXTREMITY: ICD-10-CM

## 2023-02-23 DIAGNOSIS — M10.9 ACUTE GOUT OF LEFT FOOT, UNSPECIFIED CAUSE: Primary | ICD-10-CM

## 2023-02-23 LAB
ABSOLUTE EOS #: 0.17 K/UL (ref 0–0.44)
ABSOLUTE IMMATURE GRANULOCYTE: 0.12 K/UL (ref 0–0.3)
ABSOLUTE LYMPH #: 0.86 K/UL (ref 1.1–3.7)
ABSOLUTE MONO #: 0.47 K/UL (ref 0.1–1.2)
ANION GAP SERPL CALCULATED.3IONS-SCNC: 9 MMOL/L (ref 9–17)
BASOPHILS # BLD: 2 % (ref 0–2)
BASOPHILS ABSOLUTE: 0.15 K/UL (ref 0–0.2)
BUN SERPL-MCNC: 19 MG/DL (ref 8–23)
CALCIUM SERPL-MCNC: 8.8 MG/DL (ref 8.6–10.4)
CHLORIDE SERPL-SCNC: 102 MMOL/L (ref 98–107)
CO2 SERPL-SCNC: 24 MMOL/L (ref 20–31)
CREAT SERPL-MCNC: 1 MG/DL (ref 0.7–1.2)
CRP SERPL HS-MCNC: 3.7 MG/L (ref 0–5)
EOSINOPHILS RELATIVE PERCENT: 2 % (ref 1–4)
GFR SERPL CREATININE-BSD FRML MDRD: >60 ML/MIN/1.73M2
GLUCOSE SERPL-MCNC: 158 MG/DL (ref 70–99)
HCT VFR BLD AUTO: 44 % (ref 40.7–50.3)
HGB BLD-MCNC: 12.9 G/DL (ref 13–17)
IMMATURE GRANULOCYTES: 1 %
LYMPHOCYTES # BLD: 9 % (ref 24–43)
MCH RBC QN AUTO: 28.2 PG (ref 25.2–33.5)
MCHC RBC AUTO-ENTMCNC: 29.3 G/DL (ref 28.4–34.8)
MCV RBC AUTO: 96.3 FL (ref 82.6–102.9)
MONOCYTES # BLD: 5 % (ref 3–12)
NRBC AUTOMATED: 0 PER 100 WBC
PDW BLD-RTO: 16.9 % (ref 11.8–14.4)
PLATELET # BLD AUTO: ABNORMAL K/UL (ref 138–453)
PLATELET, FLUORESCENCE: 148 K/UL (ref 138–453)
PLATELET, IMMATURE FRACTION: 6.7 % (ref 1.1–10.3)
POTASSIUM SERPL-SCNC: 4.1 MMOL/L (ref 3.7–5.3)
RBC # BLD: 4.57 M/UL (ref 4.21–5.77)
RBC # BLD: ABNORMAL 10*6/UL
SEG NEUTROPHILS: 81 % (ref 36–65)
SEGMENTED NEUTROPHILS ABSOLUTE COUNT: 7.71 K/UL (ref 1.5–8.1)
SODIUM SERPL-SCNC: 135 MMOL/L (ref 135–144)
URATE SERPL-MCNC: 8.4 MG/DL (ref 3.4–7)
WBC # BLD AUTO: 9.5 K/UL (ref 3.5–11.3)

## 2023-02-23 PROCEDURE — 73630 X-RAY EXAM OF FOOT: CPT

## 2023-02-23 PROCEDURE — 84550 ASSAY OF BLOOD/URIC ACID: CPT

## 2023-02-23 PROCEDURE — 99284 EMERGENCY DEPT VISIT MOD MDM: CPT

## 2023-02-23 PROCEDURE — 6370000000 HC RX 637 (ALT 250 FOR IP): Performed by: STUDENT IN AN ORGANIZED HEALTH CARE EDUCATION/TRAINING PROGRAM

## 2023-02-23 PROCEDURE — 86140 C-REACTIVE PROTEIN: CPT

## 2023-02-23 PROCEDURE — 80048 BASIC METABOLIC PNL TOTAL CA: CPT

## 2023-02-23 PROCEDURE — 85055 RETICULATED PLATELET ASSAY: CPT

## 2023-02-23 PROCEDURE — 85025 COMPLETE CBC W/AUTO DIFF WBC: CPT

## 2023-02-23 RX ORDER — CEPHALEXIN 250 MG/1
250 CAPSULE ORAL 4 TIMES DAILY
Qty: 28 CAPSULE | Refills: 0 | Status: SHIPPED | OUTPATIENT
Start: 2023-02-23 | End: 2023-03-02

## 2023-02-23 RX ORDER — INDOMETHACIN 50 MG/1
50 CAPSULE ORAL
Qty: 21 CAPSULE | Refills: 0 | Status: SHIPPED | OUTPATIENT
Start: 2023-02-23 | End: 2023-03-02

## 2023-02-23 RX ORDER — CEPHALEXIN 500 MG/1
500 CAPSULE ORAL ONCE
Status: COMPLETED | OUTPATIENT
Start: 2023-02-23 | End: 2023-02-23

## 2023-02-23 RX ORDER — INDOMETHACIN 50 MG/1
50 CAPSULE ORAL
Qty: 21 CAPSULE | Refills: 0 | Status: SHIPPED | OUTPATIENT
Start: 2023-02-23 | End: 2023-02-23 | Stop reason: SDUPTHER

## 2023-02-23 RX ORDER — ACETAMINOPHEN 500 MG
1000 TABLET ORAL ONCE
Status: COMPLETED | OUTPATIENT
Start: 2023-02-23 | End: 2023-02-23

## 2023-02-23 RX ORDER — CEPHALEXIN 250 MG/1
250 CAPSULE ORAL 4 TIMES DAILY
Qty: 28 CAPSULE | Refills: 0 | Status: SHIPPED | OUTPATIENT
Start: 2023-02-23 | End: 2023-02-23 | Stop reason: SDUPTHER

## 2023-02-23 RX ORDER — INDOMETHACIN 50 MG/1
50 CAPSULE ORAL ONCE
Status: COMPLETED | OUTPATIENT
Start: 2023-02-23 | End: 2023-02-23

## 2023-02-23 RX ADMIN — CEPHALEXIN 500 MG: 500 CAPSULE ORAL at 19:10

## 2023-02-23 RX ADMIN — INDOMETHACIN 50 MG: 50 CAPSULE ORAL at 19:30

## 2023-02-23 RX ADMIN — ACETAMINOPHEN 1000 MG: 500 TABLET ORAL at 18:17

## 2023-02-23 NOTE — ED PROVIDER NOTES
Faculty Sign-Out Attestation  Handoff taken on the following patient from prior Attending Physician: Griselda Beath    I was available and discussed any additional care issues that arose and coordinated the management plans with the resident(s) caring for the patient during my duty period. Any areas of disagreement with residents documentation of care or procedures are noted on the chart. I was personally present for the key portions of any/all procedures during my duty period. I have documented in the chart those procedures where I was not present during the key portions. 70-year-old male with Parkinson's, DVT last month improving. Now with toe swelling and pain. Getting labs including uric acid.   Anticipate discharge on Keflex for cellulitis if all labs are normal.    Stone Lloyd MD  Attending Physician        Stone Lloyd MD  02/23/23 3789

## 2023-02-23 NOTE — ED NOTES
Pt reports to the ED with complaints of L toe pain x2 weeks. Pulses and movement intact at this time, but pt stating there is tingling in the toes. Pt has a hx of DM, CHF, DVT, HTN, cancer. Upon assessment, redness and edema noted to the distal L foot. Pt does not have any other complaints at this time. Pt is A&O x4 and speaking in complete sentences. Pt is resting in bed comfortably, NAD noted. Pt denies chest pain, SOB, N/V/D.  Will continue to monitor     Sadia Younger RN  02/23/23 6836

## 2023-02-23 NOTE — ED PROVIDER NOTES
[address]  ED  Emergency Department Encounter  Emergency Medicine Resident     Pt Name:Fran Saldana  MRN: [id number]  [place] 1945  Date of evaluation: 2/23/23  PCP:  Rah Goldman MD  Note Started: 5:08 PM EST      CHIEF COMPLAINT       Chief Complaint   Patient presents with    Toe Pain     L foot       HISTORY OF PRESENT ILLNESS  (Location/Symptom, Timing/Onset, Context/Setting, Quality, Duration, Modifying Factors, Severity.)      Tiffanie Jackson is a 68 y.o. male who presents with left foot swelling. Patient was admitted for DVT in December 2022. He has been compliant with his Eliquis. Over the last 2 weeks, he has noticed increased swelling and pain, mostly in the left foot but also extending into the left lower leg. He states previously he had been only wearing slippers at home but around the time that this started, he started wearing shoes again. He has also been moving furniture and boxes recently as they are moving places. He denies any obvious injury to the area. Pain initially started in the second through fourth toes on the left foot. He denies any calf pain. No fevers, chills, chest pain, shortness of breath, other complaints    PAST MEDICAL / SURGICAL / SOCIAL / FAMILY HISTORY      has a past medical history of Arthritis, CAD (coronary artery disease), Cancer ([address] Cirrhosis ([address] Diabetes mellitus ([facility] [address] Hypertension, and Parkinson disease ([facility] [address] has a past surgical history that includes Coronary angioplasty with stent (2003); Mouth surgery (06/2018); and Cochlear implant (Bilateral). Social History     Socioeconomic History    Marital status:      Spouse name: Not on file    Number of children: Not on file    Years of education: Not on file    Highest education level: Not on file   Occupational History    Not on file   Tobacco Use    Smoking status: Former    Smokeless tobacco: Never   Substance and Sexual Activity    Alcohol use:  No Drug use: No    Sexual activity: Not on file   Other Topics Concern    Not on file   Social History Narrative    Not on file     Social Determinants of Health     Financial Resource Strain: Not on file   Food Insecurity: Not on file   Transportation Needs: Not on file   Physical Activity: Not on file   Stress: Not on file   Social Connections: Not on file   Intimate Partner Violence: Not on file   Housing Stability: Not on file       History reviewed. No pertinent family history. Allergies:  Atorvastatin and Lisinopril    Home Medications:  Prior to Admission medications    Medication Sig Start Date End Date Taking? Authorizing Provider   cephALEXin (KEFLEX) 250 MG capsule Take 1 capsule by mouth 4 times daily for 7 days 2/23/23 3/2/23 Yes Citlaly Miguel DO   indomethacin (INDOCIN) 50 MG capsule Take 1 capsule by mouth 3 times daily (with meals) for 7 days 2/23/23 3/2/23 Yes Santiago Miguel DO   rivaroxaban (XARELTO) 15 MG TABS tablet Take 1 tablet by mouth 2 times daily (with meals) for 42 doses 12/23/22 1/13/23  James Rogel MD   rivaroxaban (XARELTO) 20 MG TABS tablet Take 1 tablet by mouth Daily with supper 1/13/23 3/23/23  James Rogel MD   ciprofloxacin-dexamethasone (CIPRODEX) 0.3-0.1 % otic suspension Place 4 drops into the right ear 2 times daily 12/6/22   Historical Provider, MD   lidocaine (XYLOCAINE) 5 % ointment Apply 4 application topically 4 times daily 3/9/22   Historical Provider, MD   clobetasol (TEMOVATE) 0.05 % ointment Apply 1 each topically 2 times daily Apply topically 2 times daily. Historical Provider, MD   mupirocin (BACTROBAN) 2 % ointment Apply topically 2 times daily Apply topically 2 times daily.     Historical Provider, MD   albuterol sulfate HFA (VENTOLIN HFA) 108 (90 Base) MCG/ACT inhaler Inhale 2 puffs into the lungs every 6 hours as needed for Shortness of Breath 2 puffs as needed    Historical Provider, MD   potassium chloride (KLOR-CON 10) 10 MEQ extended release tablet Take 10 mEq by mouth daily    Historical Provider, MD   aspirin 81 MG EC tablet Take 81 mg by mouth daily 2/21/17   Historical Provider, MD   carvedilol (COREG) 12.5 MG tablet Take 12.5 mg by mouth 2 times daily (with meals) 11/10/22   Historical Provider, MD   DULoxetine (CYMBALTA) 20 MG extended release capsule Take 20 mg by mouth daily 9/26/22   Historical Provider, MD   furosemide (LASIX) 40 MG tablet Take 40 mg by mouth every morning 10/24/22   Historical Provider, MD   losartan (COZAAR) 50 MG tablet Take 50 mg by mouth daily 11/10/22   Historical Provider, MD   spironolactone (ALDACTONE) 25 MG tablet Take 75 mg by mouth every morning 5/5/22   Historical Provider, MD   terazosin (HYTRIN) 2 MG capsule Take 8 mg by mouth nightly 10/12/22   Historical Provider, MD   metFORMIN (GLUCOPHAGE) 1000 MG tablet Take 1,000 mg by mouth 2 times daily (with meals) 8/10/22   Historical Provider, MD   omeprazole (PRILOSEC) 20 MG delayed release capsule Take 20 mg by mouth daily (before dinner) 30 mins before dinner 4/28/22   Historical Provider, MD   hydroxyurea (HYDREA) 500 MG chemo capsule Take 500 mg by mouth daily TAKE ONE CAPSULE BY MOUTH ONCE DAILY AND TAKE ONE CAPSULE EVERY 48 HOURS (EVERY 2 DAYS) IN EVENING 9/1/22   Historical Provider, MD         REVIEW OF SYSTEMS       Review of Systems   Constitutional:  Negative for chills and fever.   HENT:  Negative for congestion and rhinorrhea.    Eyes:  Negative for visual disturbance.   Respiratory:  Negative for cough and shortness of breath.    Cardiovascular:  Positive for leg swelling. Negative for chest pain.   Gastrointestinal:  Negative for abdominal pain, constipation, diarrhea, nausea and vomiting.   Genitourinary:  Negative for dysuria and frequency.   Musculoskeletal:  Positive for arthralgias. Negative for back pain and neck pain.   Skin:  Negative for rash.   Neurological:  Negative for weakness, numbness and headaches.     PHYSICAL EXAM      INITIAL  VITALS:   /65   Pulse 83   Temp 97.2 °F (36.2 °C) (Oral)   Resp 18   SpO2 97%     Physical Exam  Constitutional:       General: He is not in acute distress. Appearance: Normal appearance. He is not ill-appearing, toxic-appearing or diaphoretic. HENT:      Head: Normocephalic and atraumatic. Mouth/Throat:      Mouth: Mucous membranes are moist.      Pharynx: Oropharynx is clear. Eyes:      Extraocular Movements: Extraocular movements intact. Cardiovascular:      Rate and Rhythm: Normal rate and regular rhythm. Heart sounds: Normal heart sounds. No murmur heard. Pulmonary:      Effort: Pulmonary effort is normal.      Breath sounds: Normal breath sounds. Abdominal:      Palpations: Abdomen is soft. Tenderness: There is no abdominal tenderness. Musculoskeletal:         General: Normal range of motion. Cervical back: Normal range of motion and neck supple. Comments: Tenderness to palpation of the forefoot and left second through fourth toes. Mild erythema in this area. He has mild swelling of the left foot and distal calf. Left foot and lower leg are slightly more warm than right leg. Pulses intact and symmetrical.   Skin:     General: Skin is warm and dry. Neurological:      General: No focal deficit present. Mental Status: He is alert and oriented to person, place, and time. DDX/DIAGNOSTIC RESULTS / EMERGENCY DEPARTMENT COURSE / MDM     Medical Decision Making  72-year-old male presenting with left foot and leg swelling and pain. Patient appears well on exam, vitals are stable. He is afebrile, no signs of systemic infection. Patient has been compliant with Eliquis making additional DVT unlikely. Swelling could be related to previous DVT and increased movement and activity. It is more warm and swollen than the right leg, concerning for cellulitis.   Will obtain x-ray to rule out acute injury as patient is not sure if anything was injured during his increased activity, will also evaluate labs for signs of systemic infection. Uric acid also sent for possible gout diagnosis although exam is not very specific for this, no hypersensitivity, multiple areas of pain. Anticipate discharge with pain control and Keflex for cellulitis treatment. Amount and/or Complexity of Data Reviewed  Labs: ordered. Radiology: ordered. Risk  OTC drugs. Prescription drug management. EKG      All EKG's are interpreted by the Emergency Department Physician who either signs or Co-signs this chart in the absence of a cardiologist.    EMERGENCY DEPARTMENT COURSE:    Patient updated on lab and imaging results. Discussed starting indomethacin for possible gout although uric acid barely elevated and exam is not completely consistent with this. We will also start Keflex for cellulitis treatment. We discussed the importance of following up closely with PCP and reasons to return. Discharged in stable condition. Patient ambulatory on discharge         PROCEDURES:      CONSULTS:  None      FINAL IMPRESSION      1. Acute gout of left foot, unspecified cause    2.  Cellulitis of left lower extremity          DISPOSITION / PLAN     DISPOSITION Decision To Discharge 02/23/2023 07:09:23 PM      PATIENT REFERRED TO:  Bethanie Roman MD  [address] [id number]  [phone]    Schedule an appointment as soon as possible for a visit in 3 days      [facility]  [address] [place] [address]  [phone]    If symptoms worsen    DISCHARGE MEDICATIONS:  Discharge Medication List as of 2/23/2023  7:38 PM          Tanesha Darling DO  Emergency Medicine Resident    (Please note that portions of thisnote were completed with a voice recognition program.  Efforts were made to edit the dictations but occasionally words are mis-transcribed.)        Tanesha Darling DO  Resident  02/24/23 1038

## 2023-02-23 NOTE — ED NOTES
The following labs were labeled with appropriate pt sticker and tubed to lab:     [x] Blue     [x] Lavender   [] on ice  [x] Green/yellow  [] Green/black [] on ice  [] Araceli Olmstead  [] on ice  [] Yellow  [] Red  [] Type/ Screen  [] ABG  [] VBG    [] COVID-19 swab    [] Rapid  [] PCR  [] Flu swab  [] Peds Viral Panel     [] Urine Sample  [] Fecal Sample  [] Pelvic Cultures  [] Blood Cultures  [] X 2  [] STREP Cultures         Marshal Coats RN  02/23/23 6580

## 2023-02-23 NOTE — ED PROVIDER NOTES
[facility]  Emergency Department  Faculty Attestation     I performed a history and physical examination of the patient and discussed management with the resident. I reviewed the residents note and agree with the documented findings and plan of care. Any areas of disagreement are noted on the chart. I was personally present for the key portions of any procedures. I have documented in the chart those procedures where I was not present during the key portions. I have reviewed the emergency nurses triage note. I agree with the chief complaint, past medical history, past surgical history, allergies, medications, social and family history as documented unless otherwise noted below. For Physician Assistant/ Nurse Practitioner cases/documentation I have personally evaluated this patient and have completed at least one if not all key elements of the E/M (history, physical exam, and MDM). Additional findings are as noted. Primary Care Physician:  No primary care provider on file. Screenings:  [unfilled]    CHIEF COMPLAINT     No chief complaint on file. RECENT VITALS:   Temp: 97.2 °F (36.2 °C),  Heart Rate: 83, Resp: 18, BP: 122/65    LABS:  Labs Reviewed - No data to display    Radiology  XR FOOT LEFT (MIN 3 VIEWS)    (Results Pending)         Attending Physician Additional  Notes  Has pain and swelling over the left toes #3 and 4. There is no injuries. No fever chills or sweats. No history of gout. History of DVT on that side which is improving. The swelling in the foot is slightly better with elevation. On exam he is uncomfortable afebrile vital signs normal.  Normal gait. There is erythema and swelling to the affected toes with mild tenderness both to palpation and movement of the toes. There is mild swelling of the top of the foot. No edema to the legs. No calf tenderness or cords. Normal pulses. Normal capillary refill.   Impression is foot swelling consider cellulitis, less likely gout. Consider labs, antibiotics, analgesics, imaging. Elia Santana.  Ted Tierney MD, [facility]  Attending Emergency  Physician                Krystal Coello MD  02/23/23 2559

## 2023-02-24 ASSESSMENT — ENCOUNTER SYMPTOMS
DIARRHEA: 0
RHINORRHEA: 0
CONSTIPATION: 0
VOMITING: 0
COUGH: 0
BACK PAIN: 0
ABDOMINAL PAIN: 0
NAUSEA: 0
SHORTNESS OF BREATH: 0

## 2023-02-24 NOTE — DISCHARGE INSTRUCTIONS
You were seen in the ER for left leg and foot pain and swelling. Your work-up showed signs of gout. Indomethacin was started for this reason. You can take this until symptoms improve, I wrote for a total of 7 days. Your symptoms may also be related to cellulitis, Keflex was started for this reason. Complete the entire course of antibiotics. Follow-up with your PCP for reevaluation or return with any new or worsening symptoms. Call today or tomorrow to follow up with Anne Saavedra MD  in 2 days for recheck or return to the Emergency Department. Take your medication as indicated, if you are given an antibiotic then make sure you get the prescription filled and take the antibiotics until finished. Drink plenty of water while taking the antibiotics. Avoid drinking alcohol or drinks that have caffeine it in while taking antibiotics. Mary Anne Hayden has some antibiotics for free; [facility] and Gisell Jose Alejandro has a 4 dollar prescription plan for some antibiotics. Use ibuprofen or Tylenol (unless prescribed medications that have Tylenol in it) for pain. You can take over the counter Ibuprofen (advil) tablets (4 tablets every 8 hours or 3 tablets every 6 hours or 2 tablets every 4 hours)    Return to the Emergency Department for worsening swelling, increase in redness to the area, notice any drainage, develop fever > 101.5, any other care or concern.

## 2023-03-10 ENCOUNTER — APPOINTMENT (OUTPATIENT)
Dept: CT IMAGING | Age: 78
End: 2023-03-10
Payer: OTHER GOVERNMENT

## 2023-03-10 ENCOUNTER — APPOINTMENT (OUTPATIENT)
Dept: GENERAL RADIOLOGY | Age: 78
End: 2023-03-10
Payer: OTHER GOVERNMENT

## 2023-03-10 ENCOUNTER — HOSPITAL ENCOUNTER (EMERGENCY)
Age: 78
Discharge: HOME | End: 2023-03-10
Attending: EMERGENCY MEDICINE
Payer: OTHER GOVERNMENT

## 2023-03-10 VITALS
DIASTOLIC BLOOD PRESSURE: 62 MMHG | RESPIRATION RATE: 18 BRPM | OXYGEN SATURATION: 98 % | HEART RATE: 64 BPM | SYSTOLIC BLOOD PRESSURE: 165 MMHG | TEMPERATURE: 97.8 F

## 2023-03-10 DIAGNOSIS — S92.424A CLOSED NONDISPLACED FRACTURE OF DISTAL PHALANX OF RIGHT GREAT TOE, INITIAL ENCOUNTER: ICD-10-CM
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
## 2023-03-10 LAB
25(OH)D3 SERPL-MCNC: 44.2 NG/ML
ABSOLUTE EOS #: 0.1 K/UL (ref 0–0.44)
ABSOLUTE IMMATURE GRANULOCYTE: 0.08 K/UL (ref 0–0.3)
ABSOLUTE LYMPH #: 0.85 K/UL (ref 1.1–3.7)
ABSOLUTE MONO #: 0.46 K/UL (ref 0.1–1.2)
ALBUMIN SERPL-MCNC: 3.9 G/DL (ref 3.5–5.2)
ANION GAP SERPL CALCULATED.3IONS-SCNC: 12 MMOL/L (ref 9–17)
BASOPHILS # BLD: 1 % (ref 0–2)
BASOPHILS ABSOLUTE: 0.11 K/UL (ref 0–0.2)
BUN SERPL-MCNC: 31 MG/DL (ref 8–23)
CALCIUM SERPL-MCNC: 8.5 MG/DL (ref 8.6–10.4)
CHLORIDE SERPL-SCNC: 104 MMOL/L (ref 98–107)
CO2 SERPL-SCNC: 21 MMOL/L (ref 20–31)
CREAT SERPL-MCNC: 1.42 MG/DL (ref 0.7–1.2)
EOSINOPHILS RELATIVE PERCENT: 1 % (ref 1–4)
GFR SERPL CREATININE-BSD FRML MDRD: 51 ML/MIN/1.73M2
GLUCOSE SERPL-MCNC: 298 MG/DL (ref 70–99)
HCT VFR BLD AUTO: 42.2 % (ref 40.7–50.3)
HGB BLD-MCNC: 12.5 G/DL (ref 13–17)
IMMATURE GRANULOCYTES: 1 %
INR PPP: 1.2
LYMPHOCYTES # BLD: 7 % (ref 24–43)
MCH RBC QN AUTO: 28.7 PG (ref 25.2–33.5)
MCHC RBC AUTO-ENTMCNC: 29.6 G/DL (ref 28.4–34.8)
MCV RBC AUTO: 96.8 FL (ref 82.6–102.9)
MONOCYTES # BLD: 4 % (ref 3–12)
NRBC AUTOMATED: 0 PER 100 WBC
PARTIAL THROMBOPLASTIN TIME: 28.8 SEC (ref 20.5–30.5)
PDW BLD-RTO: 17.6 % (ref 11.8–14.4)
PLATELET # BLD AUTO: ABNORMAL K/UL (ref 138–453)
PLATELET, FLUORESCENCE: 132 K/UL (ref 138–453)
PLATELET, IMMATURE FRACTION: 6.3 % (ref 1.1–10.3)
POTASSIUM SERPL-SCNC: 4.9 MMOL/L (ref 3.7–5.3)
PROTHROMBIN TIME: 12.6 SEC (ref 9.1–12.3)
RBC # BLD: 4.36 M/UL (ref 4.21–5.77)
RBC # BLD: ABNORMAL 10*6/UL
SEG NEUTROPHILS: 87 % (ref 36–65)
SEGMENTED NEUTROPHILS ABSOLUTE COUNT: 10.2 K/UL (ref 1.5–8.1)
SODIUM SERPL-SCNC: 137 MMOL/L (ref 135–144)
T4 FREE SERPL-MCNC: 1.29 NG/DL (ref 0.93–1.7)
TSH SERPL-ACNC: 2.99 UIU/ML (ref 0.3–5)
WBC # BLD AUTO: 11.8 K/UL (ref 3.5–11.3)

## 2023-03-10 PROCEDURE — 2580000003 HC RX 258

## 2023-03-10 PROCEDURE — 85730 THROMBOPLASTIN TIME PARTIAL: CPT

## 2023-03-10 PROCEDURE — 85055 RETICULATED PLATELET ASSAY: CPT

## 2023-03-10 PROCEDURE — 84443 ASSAY THYROID STIM HORMONE: CPT

## 2023-03-10 PROCEDURE — 82306 VITAMIN D 25 HYDROXY: CPT

## 2023-03-10 PROCEDURE — 73560 X-RAY EXAM OF KNEE 1 OR 2: CPT

## 2023-03-10 PROCEDURE — 74177 CT ABD & PELVIS W/CONTRAST: CPT

## 2023-03-10 PROCEDURE — 96374 THER/PROPH/DIAG INJ IV PUSH: CPT

## 2023-03-10 PROCEDURE — 85025 COMPLETE CBC W/AUTO DIFF WBC: CPT

## 2023-03-10 PROCEDURE — 72125 CT NECK SPINE W/O DYE: CPT

## 2023-03-10 PROCEDURE — 84439 ASSAY OF FREE THYROXINE: CPT

## 2023-03-10 PROCEDURE — 6360000002 HC RX W HCPCS

## 2023-03-10 PROCEDURE — 80048 BASIC METABOLIC PNL TOTAL CA: CPT

## 2023-03-10 PROCEDURE — 96361 HYDRATE IV INFUSION ADD-ON: CPT

## 2023-03-10 PROCEDURE — 3209999900 CT THORACIC SPINE TRAUMA RECONSTRUCTION

## 2023-03-10 PROCEDURE — 3209999900 CT LUMBAR SPINE TRAUMA RECONSTRUCTION

## 2023-03-10 PROCEDURE — 99285 EMERGENCY DEPT VISIT HI MDM: CPT

## 2023-03-10 PROCEDURE — 70450 CT HEAD/BRAIN W/O DYE: CPT

## 2023-03-10 PROCEDURE — 6360000004 HC RX CONTRAST MEDICATION

## 2023-03-10 PROCEDURE — 83036 HEMOGLOBIN GLYCOSYLATED A1C: CPT

## 2023-03-10 PROCEDURE — 73620 X-RAY EXAM OF FOOT: CPT

## 2023-03-10 PROCEDURE — 82040 ASSAY OF SERUM ALBUMIN: CPT

## 2023-03-10 PROCEDURE — 85610 PROTHROMBIN TIME: CPT

## 2023-03-10 PROCEDURE — 93005 ELECTROCARDIOGRAM TRACING: CPT | Performed by: STUDENT IN AN ORGANIZED HEALTH CARE EDUCATION/TRAINING PROGRAM

## 2023-03-10 RX ORDER — MORPHINE SULFATE 4 MG/ML
2 INJECTION, SOLUTION INTRAMUSCULAR; INTRAVENOUS ONCE
Status: COMPLETED | OUTPATIENT
Start: 2023-03-10 | End: 2023-03-10

## 2023-03-10 RX ORDER — 0.9 % SODIUM CHLORIDE 0.9 %
1000 INTRAVENOUS SOLUTION INTRAVENOUS ONCE
Status: COMPLETED | OUTPATIENT
Start: 2023-03-10 | End: 2023-03-10

## 2023-03-10 RX ADMIN — SODIUM CHLORIDE 1000 ML: 9 INJECTION, SOLUTION INTRAVENOUS at 16:56

## 2023-03-10 RX ADMIN — MORPHINE SULFATE 2 MG: 4 INJECTION INTRAVENOUS at 16:56

## 2023-03-10 RX ADMIN — IOPAMIDOL 75 ML: 755 INJECTION, SOLUTION INTRAVENOUS at 15:53

## 2023-03-10 ASSESSMENT — ENCOUNTER SYMPTOMS
COLOR CHANGE: 1
STRIDOR: 0
FACIAL SWELLING: 0
NAUSEA: 0
ABDOMINAL PAIN: 0
EYE PAIN: 0
PHOTOPHOBIA: 0
BACK PAIN: 1
SORE THROAT: 0
EYE REDNESS: 0
CHEST TIGHTNESS: 0
VOMITING: 0
ABDOMINAL DISTENTION: 0
SHORTNESS OF BREATH: 0
CONSTIPATION: 0
SINUS PAIN: 0
RHINORRHEA: 0
DIARRHEA: 0
BLOOD IN STOOL: 0
COUGH: 0

## 2023-03-10 ASSESSMENT — PAIN - FUNCTIONAL ASSESSMENT: PAIN_FUNCTIONAL_ASSESSMENT: 0-10

## 2023-03-10 ASSESSMENT — PAIN DESCRIPTION - LOCATION
LOCATION: BACK

## 2023-03-10 ASSESSMENT — PAIN SCALES - GENERAL
PAINLEVEL_OUTOF10: 8
PAINLEVEL_OUTOF10: 8
PAINLEVEL_OUTOF10: 2

## 2023-03-10 ASSESSMENT — PAIN DESCRIPTION - DESCRIPTORS
DESCRIPTORS: ACHING

## 2023-03-10 NOTE — H&P
TRAUMA H&P/CONSULT    PATIENT NAME: Gaurav Hargrove  YOB: 1945  MEDICAL RECORD NO. [id number]   DATE: 3/10/2023  PRIMARY CARE PHYSICIAN: Renee Mullen MD  PATIENT EVALUATED AT THE REQUEST OF : Rebecca Guillaume    ACTIVATION   []Trauma Alert     [] Trauma Priority     [x]Trauma Consult. Patient Active Problem List   Diagnosis    Acute deep vein thrombosis (DVT) of left lower extremity (HCC)    Type 2 diabetes mellitus (HCC)    CHF (congestive heart failure) (HCC)    Parkinson's disease (HCC)    Hearing loss    Hypertension    Liver cirrhosis (HCC)    Coronary artery disease    History of hepatocellular carcinoma    History of bladder cancer    Fall       IMPRESSION AND PLAN:       Diagnosis: concern for liver laceration    - hx of HCC, s/p ablation 4 yrs ago   - hg 12.5, trend    - hold ac/ap   - discussed recommendation to stay overnight in light of fall and anticoagulation   - patient adamant about returning to home   - risks of bleeding explained, patient expressed understanding, but still voicing wish to go home    Diagnosis: lumbar pain to palpation   - ct thoracic lumbar spines    Ekg  Labs reviewed  IVF hydration   Dispo pending final scans and patient wishes    If intracranial hemorrhage is present, is it a:  [] BIG 1  [] BIG 2  [] BIG 3  If chest wall injury: Rib score___    CONSULT SERVICES    [] Neurosurgery     [] Orthopedic Surgery    [] Cardiothoracic     [] Facial Trauma    [] Plastic Surgery (Burn)    [] Pediatric Surgery     [] Internal Medicine    [] Pulmonary Medicine    [] Geriatrics    [] Other:      HISTORY:     Chief Complaint:  \"Back pain\"    GENERAL DATA  Patient information was obtained from patient. History/Exam limitations: none.   Injury Date:  3/9/23   Approximate Injury Time: 1700        Transport mode:   []Ambulance      [] Helicopter     []Car       [] Other  Referring Hospital: none    SETTING OF TRAUMATIC EVENT   Location (e.g., home, farm, industry, street): home  Specific Details of Location (e.g., bedroom, kitchen, garage, highway): front steps    MECHANISM OF INJURY    [] Motor Vehicle Collision   Specific vehicle type involved (e.g., sedan, minivan, SUV, pickup truck): Type of collision  [] Single Vehicle Collision  []Multiple Vehicle Collision  [] unknown collision type  Collision with (e.g., type of vehicle, building, barn, ditch, tree):     Mechanism considerations  [] Fatality in Same Vehicle      []Ejected       []Rollover          []Extricated    Internal Compartment   []                      []Passenger:      []Front Seat        []Rear Seat     Personal Restraints  [] Unrestrained   []Lap Belt Only Restrained   [] Shoulder Belt Only Restrained  [] 3 Point Restrained  [] unknown     Air Bags  [] Front Air Bag  []Side Air Bag  []Curtain Airbag []Air Bag Not Deployed    []No Air Bag equipped in vehicle    Pediatric Consideration:      [] Booster Seat  []Infant Car Seat  [] Child Car Seat      [] Motorcycle     []Electric Bike/Moped   [] ATV   [] Bicycle/Scooter (manual)   Wearing Helmet     []Yes     []No    []Unknown         [x] Fall    []From Standing     [x]From Height 2__ Ft     [x]Down ___steps  []Other___    [] Assault with ____    [] Gunshot  Specify caliber / type of gun: ____________________________    [] Stabbing  Specify weapon type, size: _____________________________    [] Burn  []Flame   []Scald   []Electrical   []Chemical  []Inhalation   []House fire    [] Other ______________________________________________________    [] Eye protection  []Boots   []Flotation device   []Leather outerwear  []Sports gear []Other:___       HISTORY:     Yvonne Self is a male that presented to the Emergency Department following mechanical fall down concrete steps 2 ft, landing on his left side and hit his head. Rolled. Was able to stand and ambulate with assistance. State she came to ed because of back pain, unable to lie flat.  Denies numbness tingling weakness in extremities. He has hx of CAD with stent placed 20 yrs ago, recent dvt on xarelto, polycythemia, HCC s/p ablation 4 yrs ago, cirrhoiss, copd, ckd, DM. He follows with the [place]. Heart rate regular, hypertensive to 789 systolic, satting >77% on room air. Labs significant for cr 1.42, glucose 298, hg 12.5. CT with concern for liver laceration. Discussed with radiologist, could be recurrent [address] at ablation site. Incidental finding, small pulm nodules. Traumatic loss of Consciousness [x]No   []Yes Duration(min)       [] Unknown     Total Fluids Given Prior To Arrival  mL    MEDICATIONS:   []  None     []  Information not available due to exam limitations documented above    Prior to Admission medications    Medication Sig Start Date End Date Taking? Authorizing Provider   indomethacin (INDOCIN) 50 MG capsule Take 1 capsule by mouth 3 times daily (with meals) for 7 days 2/23/23 3/2/23  Jose Juan Miguel DO   rivaroxaban (XARELTO) 15 MG TABS tablet Take 1 tablet by mouth 2 times daily (with meals) for 42 doses 12/23/22 1/13/23  Guadalupe Slade MD   rivaroxaban (XARELTO) 20 MG TABS tablet Take 1 tablet by mouth Daily with supper 1/13/23 3/23/23  Guadalupe Slade MD   ciprofloxacin-dexamethasone (CIPRODEX) 0.3-0.1 % otic suspension Place 4 drops into the right ear 2 times daily 12/6/22   Historical Provider, MD   lidocaine (XYLOCAINE) 5 % ointment Apply 4 application topically 4 times daily 3/9/22   Historical Provider, MD   clobetasol (TEMOVATE) 0.05 % ointment Apply 1 each topically 2 times daily Apply topically 2 times daily. Historical Provider, MD   mupirocin (BACTROBAN) 2 % ointment Apply topically 2 times daily Apply topically 2 times daily.     Historical Provider, MD   albuterol sulfate HFA (VENTOLIN HFA) 108 (90 Base) MCG/ACT inhaler Inhale 2 puffs into the lungs every 6 hours as needed for Shortness of Breath 2 puffs as needed    Historical Provider, MD   potassium chloride (KLOR-CON 10) 10 MEQ extended release tablet Take 10 mEq by mouth daily    Historical Provider, MD   aspirin 81 MG EC tablet Take 81 mg by mouth daily 2/21/17   Historical Provider, MD   carvedilol (COREG) 12.5 MG tablet Take 12.5 mg by mouth 2 times daily (with meals) 11/10/22   Historical Provider, MD   DULoxetine (CYMBALTA) 20 MG extended release capsule Take 20 mg by mouth daily 9/26/22   Historical Provider, MD   furosemide (LASIX) 40 MG tablet Take 40 mg by mouth every morning 10/24/22   Historical Provider, MD   losartan (COZAAR) 50 MG tablet Take 50 mg by mouth daily 11/10/22   Historical Provider, MD   spironolactone (ALDACTONE) 25 MG tablet Take 75 mg by mouth every morning 5/5/22   Historical Provider, MD   terazosin (HYTRIN) 2 MG capsule Take 8 mg by mouth nightly 10/12/22   Historical Provider, MD   metFORMIN (GLUCOPHAGE) 1000 MG tablet Take 1,000 mg by mouth 2 times daily (with meals) 8/10/22   Historical Provider, MD   omeprazole (PRILOSEC) 20 MG delayed release capsule Take 20 mg by mouth daily (before dinner) 30 mins before dinner 4/28/22   Historical Provider, MD   hydroxyurea (HYDREA) 500 MG chemo capsule Take 500 mg by mouth daily TAKE ONE CAPSULE BY MOUTH ONCE DAILY AND TAKE ONE CAPSULE EVERY 50 HOURS (EVERY 2 DAYS) IN EVENING 9/1/22   Historical Provider, MD       ALLERGIES:   []  None    []   Information not available due to exam limitations documented above   Atorvastatin and Lisinopril    PAST MEDICAL/SURGICAL HISTORY: []  None   []   Information not available due to exam limitations documented above    has a past medical history of Arthritis, CAD (coronary artery disease), Cancer ([facility] [address] Cirrhosis ([facility] Diabetes mellitus ([facility] Hypertension, and Parkinson disease ([facility] [facility] has a past surgical history that includes Coronary angioplasty with stent (2003); Mouth surgery (06/2018); and Cochlear implant (Bilateral).     FAMILY HISTORY   []   Information not available due to exam limitations documented above  No significant family hx to report  family history is not on file. SOCIAL HISTORY  []   Information not available due to exam limitations documented above   reports that he has quit smoking. He has never used smokeless tobacco.   reports no history of alcohol use. reports no history of drug use. Review of Systems:    Review of Systems   Constitutional:  Negative for chills, fatigue and fever. HENT:  Negative for congestion, nosebleeds and sore throat. Eyes:  Negative for photophobia, pain, redness and visual disturbance. Respiratory:  Negative for cough, chest tightness, shortness of breath and stridor. Cardiovascular:  Negative for chest pain, palpitations and leg swelling. Gastrointestinal:  Negative for abdominal distention, abdominal pain, blood in stool, constipation, diarrhea, nausea and vomiting. Genitourinary:  Negative for dysuria, flank pain and hematuria. Musculoskeletal:  Positive for back pain. Negative for arthralgias, myalgias, neck pain and neck stiffness. Neurological:  Negative for dizziness, tremors, seizures, speech difficulty, weakness, light-headedness, numbness and headaches. Psychiatric/Behavioral:  Negative for agitation, behavioral problems, confusion and decreased concentration. PHYSICAL EXAMINATION:     VITAL SIGNS:   Vitals:    03/10/23 1530   BP: (!) 156/61   Pulse: 68   Resp: 19   Temp:    SpO2: 97%       Physical Exam  Constitutional:       General: He is not in acute distress. Appearance: Normal appearance. HENT:      Head: Normocephalic and atraumatic. Right Ear: External ear normal.      Left Ear: External ear normal.      Nose: Nose normal.      Mouth/Throat:      Mouth: Mucous membranes are moist.      Pharynx: Oropharynx is clear. Eyes:      Extraocular Movements: Extraocular movements intact. Conjunctiva/sclera: Conjunctivae normal.      Pupils: Pupils are equal, round, and reactive to light.    Cardiovascular:      Rate and Rhythm: Normal rate and regular rhythm. Pulses: Normal pulses. Pulmonary:      Effort: Pulmonary effort is normal. No respiratory distress. Breath sounds: No wheezing. Abdominal:      General: There is no distension. Palpations: Abdomen is soft. Tenderness: There is no abdominal tenderness. There is no guarding. Comments: Right subcostal echhymosis    Musculoskeletal:         General: Tenderness present. No deformity. Cervical back: Neck supple. No rigidity or tenderness. Comments: L knee tenderness and abrasion  Midline lumbar spine tenderness to palpation   Skin:     General: Skin is warm. Capillary Refill: Capillary refill takes less than 2 seconds. Coloration: Skin is not jaundiced. Comments: R great toe ecchymosis   Neurological:      General: No focal deficit present. Mental Status: He is alert and oriented to person, place, and time. Mental status is at baseline. Psychiatric:         Mood and Affect: Mood normal.         Behavior: Behavior normal.              RADIOLOGY  CT THORACIC SPINE TRAUMA RECONSTRUCTION   Preliminary Result   No acute thoracic or lumbar spine trauma. Mild multilevel degenerative disc   disease. CT LUMBAR SPINE TRAUMA RECONSTRUCTION   Preliminary Result   No acute thoracic or lumbar spine trauma. Mild multilevel degenerative disc   disease. CT CHEST ABDOMEN PELVIS W CONTRAST Additional Contrast? None   Final Result   1. No acute intrathoracic abnormality. 2. Wedge-shaped hypoattenuation in the right hepatic lobe is concerning for   laceration given the history of trauma. Due to the depth, this would   constitute grade 3 injury. Short interval follow-up imaging may be useful to   exclude underlying lesion. 3. Cirrhosis with splenomegaly and small ascites. 4. Noncalcified pulmonary nodules measure up to 3 mm.       RECOMMENDATIONS:   Fleischner Society guidelines for follow-up and management of incidentally   detected pulmonary nodules:      Multiple Solid Nodules:      Nodule size less than 6 mm   In a low-risk patient, no routine follow-up. In a high-risk patient, optional CT at 12 months. - Low risk patients include individuals with minimal or absent history of   smoking and other known risk factors. - High risk patients include individuals with a history or smoking or known   risk factors. Radiology 2017 http://pubs. rsna.org/doi/full/10.1148/radiol. 2754130864         CT HEAD WO CONTRAST   Final Result   No acute bony abnormality of the cervical spine. Mild central and cortical cerebral atrophy. Mild chronic deep white matter ischemic changes      No acute intracranial abnormalities are noted. CT CERVICAL SPINE WO CONTRAST   Final Result   No acute bony abnormality of the cervical spine. Mild central and cortical cerebral atrophy. Mild chronic deep white matter ischemic changes      No acute intracranial abnormalities are noted. XR KNEE LEFT (1-2 VIEWS)   Preliminary Result   No acute fractures or dislocations in either knee. No evidence of knee   effusion. Mild bilateral tricompartmental degenerative changes. XR KNEE RIGHT (1-2 VIEWS)   Preliminary Result   No acute fractures or dislocations in either knee. No evidence of knee   effusion. Mild bilateral tricompartmental degenerative changes.          XR FOOT RIGHT (2 VIEWS)    (Results Pending)         LABS  Labs Reviewed   CBC WITH AUTO DIFFERENTIAL - Abnormal; Notable for the following components:       Result Value    WBC 11.8 (*)     Hemoglobin 12.5 (*)     RDW 17.6 (*)     Seg Neutrophils 87 (*)     Lymphocytes 7 (*)     Immature Granulocytes 1 (*)     Segs Absolute 10.20 (*)     Absolute Lymph # 0.85 (*)     All other components within normal limits   BASIC METABOLIC PANEL - Abnormal; Notable for the following components:    Glucose 298 (*)     BUN 31 (*)     Creatinine 1.42 (*)     Est, Glom Filt Rate 51 (*)     Calcium 8.5 (*)     All other components within normal limits   PROTIME-INR - Abnormal; Notable for the following components:    Protime 12.6 (*)     All other components within normal limits   IMMATURE PLATELET FRACTION - Abnormal; Notable for the following components:    Platelet, Fluorescence 132 (*)     All other components within normal limits   APTT         Elvira Atkinson,   3/10/23, 6:26 PM              Trauma Attending Attestation      I have reviewed the above [facility] note(s) and confirmed the key elements of the medical history and physical exam. I have seen and examined the pt. I have discussed the findings, established the care plan and recommendations with Resident.       Pennie Miller,   3/10/2023  9:51 PM

## 2023-03-10 NOTE — DISCHARGE INSTRUCTIONS
TRAUMA  You underwent CT imaging during your hospital stay. Listed below are incidental findings that did not require acute treatment;  - Noncalcified pulmonary nodules measure up to 3 mm. - R sided liver laceration vs lesion    Please follow up with your primary care doctor or established specialist regarding these findings.    -Please drink plenty of fluids throughout the day.     -Please wear the boot until you can be assessed by your primary care physician    -Return to the emergency room if experiencing the following symptoms: Headache, vision changes, hearing changes, facial droop, focal weakness, shortness of breath, chest pain, abdominal pain, vomiting of blood, black tarry stools, bright red blood per rectum, changes with urination, changes with skin/hair/nail and/or any change in baseline health

## 2023-03-10 NOTE — ED PROVIDER NOTES
[facility] [address] [address] [facility]     Emergency Department     Faculty Attestation        I performed a history and physical examination of the patient and discussed management with the resident. I reviewed the residents note and agree with the documented findings and plan of care. Any areas of disagreement are noted on the chart. I was personally present for the key portions of any procedures. I have documented in the chart those procedures where I was not present during the key portions. I have reviewed the emergency nurses triage note. I agree with the chief complaint, past medical history, past surgical history, allergies, medications, social and family history as documented unless otherwise noted below. For mid-level providers such as nurse practitioners as well as physicians assistants:    I have personally seen and evaluated the patient. I find the patient's history and physical exam are consistent with NP/PA documentation. I agree with the care provided, treatment rendered, disposition, & follow-up plan. Additional findings are as noted.     Vital Signs: BP (!) 166/65   Pulse 67   Temp 97.8 °F (36.6 °C)   Resp 19   SpO2 96%   PCP:  Jeffery Verma MD    Pertinent Comments:           Critical Care  None          Orlando Truong MD    Attending Emergency Medicine Physician            Huan Moscoso MD  03/10/23 0386

## 2023-03-10 NOTE — ED NOTES
Pt came into the ED with wife, due to a fall pt is Aox4, pt can ambulate with 1 assist. Pt denies SOB and chest pain.       Lesvia Chang RN  03/10/23 6143

## 2023-03-10 NOTE — ED PROVIDER NOTES
[facility]  ED  Emergency Department Encounter  Emergency Medicine Resident     Pt Name:Fran Dumont  MRN: [id number]  [place] 1945  Date of evaluation: 3/10/23  PCP:  Fate Schaumann, MD  Note Started: 3:20 PM EST      CHIEF COMPLAINT       Chief Complaint   Patient presents with    Fall       HISTORY OF PRESENT ILLNESS  (Location/Symptom, Timing/Onset, Context/Setting, Quality, Duration, Modifying Factors, Severity.)      Senthil Ron is a 68 y.o. male on Eliquis for DVT who presents status post fall yesterday when he lost his footing while carrying a box. Patient hit his knees his lower back and his head. Patient denies any loss of consciousness. Patient has had a small headache since then which is improved with over-the-counter Tylenol. Patient is a history of diabetes, liver cirrhosis, gout. Patient is compliant with all his medications. Wife is at bedside. Only has allergies to lisinopril with a cough. PAST MEDICAL / SURGICAL / SOCIAL / FAMILY HISTORY      has a past medical history of Arthritis, CAD (coronary artery disease), Cancer ([facility] [address] Cirrhosis ([facility] [address] Diabetes mellitus ([facility] [address] Hypertension, and Parkinson disease ([facility] [address] has a past surgical history that includes Coronary angioplasty with stent (2003); Mouth surgery (06/2018); and Cochlear implant (Bilateral).       Social History     Socioeconomic History    Marital status:      Spouse name: Not on file    Number of children: Not on file    Years of education: Not on file    Highest education level: Not on file   Occupational History    Not on file   Tobacco Use    Smoking status: Former    Smokeless tobacco: Never   Substance and Sexual Activity    Alcohol use: No    Drug use: No    Sexual activity: Not on file   Other Topics Concern    Not on file   Social History Narrative    Not on file     Social Determinants of Health     Financial Resource Strain: Not on file   Food Insecurity: Not on file Transportation Needs: Not on file   Physical Activity: Not on file   Stress: Not on file   Social Connections: Not on file   Intimate Partner Violence: Not on file   Housing Stability: Not on file       No family history on file. Allergies:  Atorvastatin and Lisinopril    Home Medications:  Prior to Admission medications    Medication Sig Start Date End Date Taking? Authorizing Provider   indomethacin (INDOCIN) 50 MG capsule Take 1 capsule by mouth 3 times daily (with meals) for 7 days 2/23/23 3/2/23  Sima Miguel DO   rivaroxaban (XARELTO) 15 MG TABS tablet Take 1 tablet by mouth 2 times daily (with meals) for 42 doses 12/23/22 1/13/23  Taqueria Loving MD   rivaroxaban (XARELTO) 20 MG TABS tablet Take 1 tablet by mouth Daily with supper 1/13/23 3/23/23  Taqueria Loving MD   ciprofloxacin-dexamethasone (CIPRODEX) 0.3-0.1 % otic suspension Place 4 drops into the right ear 2 times daily 12/6/22   Historical Provider, MD   lidocaine (XYLOCAINE) 5 % ointment Apply 4 application topically 4 times daily 3/9/22   Historical Provider, MD   clobetasol (TEMOVATE) 0.05 % ointment Apply 1 each topically 2 times daily Apply topically 2 times daily. Historical Provider, MD   mupirocin (BACTROBAN) 2 % ointment Apply topically 2 times daily Apply topically 2 times daily.     Historical Provider, MD   albuterol sulfate HFA (VENTOLIN HFA) 108 (90 Base) MCG/ACT inhaler Inhale 2 puffs into the lungs every 6 hours as needed for Shortness of Breath 2 puffs as needed    Historical Provider, MD   potassium chloride (KLOR-CON 10) 10 MEQ extended release tablet Take 10 mEq by mouth daily    Historical Provider, MD   aspirin 81 MG EC tablet Take 81 mg by mouth daily 2/21/17   Historical Provider, MD   carvedilol (COREG) 12.5 MG tablet Take 12.5 mg by mouth 2 times daily (with meals) 11/10/22   Historical Provider, MD   DULoxetine (CYMBALTA) 20 MG extended release capsule Take 20 mg by mouth daily 9/26/22   Historical Provider, MD furosemide (LASIX) 40 MG tablet Take 40 mg by mouth every morning 10/24/22   Historical Provider, MD   losartan (COZAAR) 50 MG tablet Take 50 mg by mouth daily 11/10/22   Historical Provider, MD   spironolactone (ALDACTONE) 25 MG tablet Take 75 mg by mouth every morning 5/5/22   Historical Provider, MD   terazosin (HYTRIN) 2 MG capsule Take 8 mg by mouth nightly 10/12/22   Historical Provider, MD   metFORMIN (GLUCOPHAGE) 1000 MG tablet Take 1,000 mg by mouth 2 times daily (with meals) 8/10/22   Historical Provider, MD   omeprazole (PRILOSEC) 20 MG delayed release capsule Take 20 mg by mouth daily (before dinner) 30 mins before dinner 4/28/22   Historical Provider, MD   hydroxyurea (HYDREA) 500 MG chemo capsule Take 500 mg by mouth daily TAKE ONE CAPSULE BY MOUTH ONCE DAILY AND TAKE ONE CAPSULE EVERY 48 HOURS (EVERY 2 DAYS) IN EVENING 9/1/22   Historical Provider, MD       REVIEW OF SYSTEMS       Review of Systems   Constitutional:  Positive for activity change. Negative for appetite change, chills, diaphoresis, fatigue and fever. HENT:  Negative for congestion, ear pain, facial swelling, hearing loss, postnasal drip, rhinorrhea and sinus pain. Eyes:  Negative for visual disturbance. Respiratory:  Negative for cough, chest tightness and shortness of breath. Cardiovascular:  Negative for chest pain. Gastrointestinal:  Negative for abdominal distention, abdominal pain, nausea and vomiting. Genitourinary:  Negative for flank pain. Musculoskeletal:  Positive for arthralgias, back pain and myalgias. Negative for gait problem, joint swelling, neck pain and neck stiffness. Skin:  Positive for color change and wound. Negative for pallor and rash. Neurological:  Positive for headaches. Negative for dizziness, weakness, light-headedness and numbness. Hematological:  Bruises/bleeds easily.      PHYSICAL EXAM      INITIAL VITALS:   BP (!) 165/62   Pulse 59   Temp 97.8 °F (36.6 °C)   Resp 17   SpO2 97% Physical Exam  Vitals and nursing note reviewed. Constitutional:       General: He is not in acute distress. Appearance: Normal appearance. He is obese. He is not ill-appearing, toxic-appearing or diaphoretic. HENT:      Head: Normocephalic and atraumatic. Comments: No contusion, ecchymotic changes or swelling of the scalp     Right Ear: External ear normal.      Left Ear: External ear normal.      Nose: Nose normal. No congestion or rhinorrhea. Mouth/Throat:      Mouth: Mucous membranes are moist.      Pharynx: Oropharynx is clear. No oropharyngeal exudate or posterior oropharyngeal erythema. Eyes:      General: No scleral icterus. Right eye: No discharge. Left eye: No discharge. Extraocular Movements: Extraocular movements intact. Conjunctiva/sclera: Conjunctivae normal.      Pupils: Pupils are equal, round, and reactive to light. Cardiovascular:      Rate and Rhythm: Normal rate and regular rhythm. Pulses: Normal pulses. Heart sounds: Normal heart sounds. Pulmonary:      Effort: Pulmonary effort is normal. No respiratory distress. Breath sounds: Normal breath sounds. No wheezing or rales. Abdominal:      General: Abdomen is flat. There is no distension. Palpations: Abdomen is soft. There is no mass. Tenderness: There is no abdominal tenderness. There is no guarding. Hernia: No hernia is present. Comments: Right upper quadrant ecchymotic changes without tenderness to palpation   Musculoskeletal:         General: Tenderness and signs of injury present. No swelling or deformity. Normal range of motion. Cervical back: Normal range of motion. Right lower leg: No edema. Left lower leg: No edema.       Comments: Right upper extremity: Elbow with ecchymotic changes; no induration or erythema of the surrounding tissue    Bilateral lower extremity knees: Superficial abrasions without ecchymotic changes or active bleeding at the sites. Skin:     General: Skin is warm and dry. Capillary Refill: Capillary refill takes less than 2 seconds. Coloration: Skin is not jaundiced or pale. Findings: Bruising present. No erythema, lesion or rash. Neurological:      General: No focal deficit present. Mental Status: He is alert and oriented to person, place, and time. Psychiatric:         Mood and Affect: Mood normal.         Behavior: Behavior normal.         Thought Content: Thought content normal.         Judgment: Judgment normal.         DDX/DIAGNOSTIC RESULTS / EMERGENCY DEPARTMENT COURSE / MDM     Medical Decision Making  See ED course    Amount and/or Complexity of Data Reviewed  Independent Historian: spouse  Labs: ordered. Decision-making details documented in ED Course. Radiology: ordered. Risk  Prescription drug management. Critical Care  Total time providing critical care: < 30 minutes      EKG    All EKG's are interpreted by the Emergency Department Physician who either signs or Co-signs this chart in the absence of a cardiologist.    EMERGENCY DEPARTMENT COURSE:    ED Course as of 03/10/23 1942   Fri Mar 10, 2023   2364 Patient is a 80-year-old male on Eliquis for DVT who presents status post fall yesterday when he lost his footing while carrying a box. Patient hit his knees his lower back and his head. Patient denies any loss of consciousness. Patient has had a small headache since then which is improved with over-the-counter Tylenol. Patient is a history of diabetes, liver cirrhosis, gout. On physical exam there is no contusion or overlying ecchymotic changes of the skin at the site where the patient hit his head. On his right elbow there is a small evolving superficial ecchymotic change that is yellow and purple in color. He also has ecchymotic changes in the right upper quadrant. Both are nontender to palpation. Patient also has superficial abrasions on bilateral knees.     Patient is compliant with all his medications. Wife is at bedside. Only has allergies to lisinopril with a cough. Concern for epidural, subdural, intraparenchymal hemorrhage on anticoagulants. Also concern for lumbar and thoracic vertebral fractures and/or muscle strain. Also concern for potential for hepatic bleed. [AS]   1941 Ordered CBC, BMP, coags, CT head without contrast, CT cervical spine without contrast, CT chest abdomen pelvis with and without contrast.  We will update patient. [AS]   2373 Will perform bedside ultrasound FAST exam to rule out intra-abdominal bleeding based on physical exam findings of ecchymotic changes over the right upper quadrant while on Eliquis and having liver cirrhosis [AS]   5843 Point-of-care ultrasound performed by resident. Looked at right upper quadrant, left upper quadrant and bladder. No obvious signs of free fluid. [AS]   7848 Patient has a white blood cell count of 11.8 but is afebrile on arrival.   [AS]   25 [id number] CT radiology final results:  IMPRESSION:  No acute bony abnormality of the cervical spine. Mild central and cortical cerebral atrophy. Mild chronic deep white matter ischemic changes     No acute intracranial abnormalities are noted. [AS]   8311 Radiology final read:  IMPRESSION:  No acute fractures or dislocations in either knee. No evidence of knee  effusion. Mild bilateral tricompartmental degenerative changes [AS]   1647 Creatinine(!): 1.42  Patient's creatinine is above baseline based on chart review. Patient is not dehydrated, tachycardic or appearing volume down. We will give a liter of normal saline to help with creatinine. Additionally, he has had IV contrast so additional fluids will help. [AS]   6839 Talked with patient and wife at bedside. Barring any abnormalities or acute findings on CT abdomen pelvis, patient and wife are comfortable going home following completion of IV fluids with close follow-up.  [AS]   8638 CT abdomen pelvis shows a liver laceration of 4.4 cm (Grade 3). We will place trauma surgery consult. [AS]   188 [facility] Trauma resident stopped by to discussed patient with ED provider. They state the liver laceration read by radiology is not a liver lack. Patient had an ablation on his liver. Additionally, the radiologically read laceration is round and not linear in shape. Trauma surgeon modified some imaging for spinal reconstruction images. Pending their final review. Patient states that he does not want to stay and would like to go home following the results. [AS]   [id number] Radiology final read of trauma reconstructions of thoracic and lumbar spine:  IMPRESSION:  No acute thoracic or lumbar spine trauma. Mild multilevel degenerative disc disease. [AS]   1814 Portable x-ray at bedside for right foot [AS]   7133 Pending final read of XR R foot [AS]   1847 IMPRESSION:  Acute nondisplaced fracture of the distal phalanx 1st digit [AS]   1923 Explained results to patient. He still would like to go home. States that he will follow-up with his liver doc as well as his PCP this coming week. Explained to patient to drink plenty of fluids and have a recheck of his creatinine levels. Patient acknowledged understanding. We will send patient out with a postop boot due to distal nondisplaced phalanx fracture [AS]      ED Course User Index  [AS] Nik Lynn DO       PROCEDURES:  None    CONSULTS:  IP CONSULT TO TRAUMA SURGERY    CRITICAL CARE:  There was significant risk of life threatening deterioration of patient's condition requiring my direct management. Critical care time spent 30 minutes, excluding any documented procedures. FINAL IMPRESSION      1. Fall, initial encounter    2.  Closed nondisplaced fracture of distal phalanx of right great toe, initial encounter          DISPOSITION / PLAN     DISPOSITION Decision To Discharge 03/10/2023 07:24:21 PM      PATIENT REFERRED TO:  Alvaro Sam MD  [facility] [address] [address]    On 3/13/2023  For follow up and monitoring of Cr; will need blood labs    DISCHARGE MEDICATIONS:  New Prescriptions    No medications on file       Briseyda Veliz DO  Emergency Medicine Resident    (Please note that portions of thisnote were completed with a voice recognition program.  Efforts were made to edit the dictations but occasionally words are mis-transcribed.)       Lidia Díaz DO  Resident  03/10/23 1943

## 2023-03-10 NOTE — ED PROVIDER NOTES
Faculty Sign-Out Attestation  Handoff taken on the following patient from prior Attending Physician: Shey Rowell    I was available and discussed any additional care issues that arose and coordinated the management plans with the resident(s) caring for the patient during my duty period. Any areas of disagreement with residents documentation of care or procedures are noted on the chart. I was personally present for the key portions of any/all procedures during my duty period. I have documented in the chart those procedures where I was not present during the key portions. CT HEAD WO CONTRAST   Final Result   No acute bony abnormality of the cervical spine. Mild central and cortical cerebral atrophy. Mild chronic deep white matter ischemic changes      No acute intracranial abnormalities are noted. CT CERVICAL SPINE WO CONTRAST   Final Result   No acute bony abnormality of the cervical spine. Mild central and cortical cerebral atrophy. Mild chronic deep white matter ischemic changes      No acute intracranial abnormalities are noted. XR KNEE LEFT (1-2 VIEWS)   Preliminary Result   No acute fractures or dislocations in either knee. No evidence of knee   effusion. Mild bilateral tricompartmental degenerative changes. XR KNEE RIGHT (1-2 VIEWS)   Preliminary Result   No acute fractures or dislocations in either knee. No evidence of knee   effusion. Mild bilateral tricompartmental degenerative changes. CT CHEST ABDOMEN PELVIS W CONTRAST Additional Contrast? None    (Results Pending)         Sony Canales MD  Attending Physician    EKG interpretation: Sinus rhythm 74. Wide QRS at 150 with a left axis deviation incomplete right bundle branch block. No acute ST or T changes given block.        Sony Canales MD  03/10/23 3213

## 2023-03-11 LAB
EKG ATRIAL RATE: 74 BPM
EKG P AXIS: 64 DEGREES
EKG P-R INTERVAL: 170 MS
EKG Q-T INTERVAL: 434 MS
EKG QRS DURATION: 150 MS
EKG QTC CALCULATION (BAZETT): 481 MS
EKG R AXIS: -34 DEGREES
EKG T AXIS: 5 DEGREES
EKG VENTRICULAR RATE: 74 BPM

## 2023-03-11 NOTE — ED NOTES
Post op shoe applied to patient's right foot. Patient instructed on post op shoe use and maintenance. Patient verbalized understanding.       Omega Duarte RN  03/10/23 0031

## 2023-03-11 NOTE — PROGRESS NOTES
[facility] [address]     Emergency/Trauma Note    PATIENT NAME: Hugo Flores    Shift date: 3/10/23  Shift day: Friday   Shift # 2    Room # 15/15   Name: Hugo Flores            Age: 68 y.o. Gender: male          Jew: None   Place of Mosque:     Trauma/Incident type: Adult Trauma Consult  Admit Date & Time: 3/10/2023  2:32 PM  TRAUMA NAME: N/A    ADVANCE DIRECTIVES IN CHART? No    NAME OF DECISION MAKER: N/A    RELATIONSHIP OF DECISION MAKER TO PATIENT: N/A    PATIENT/EVENT DESCRIPTION:  Hugo Flores is a 68 y.o. male who arrived at [facility].  was a ministry of presence to patient and medical staff. Writer introduced self as . Patient did not appear to mind  presence and engaged in conversation.  provided a supportive presence allowing space for feelings and emotions. Patient appeared calm and coping while hopeful of leaving the hospital soon. Pt to be admitted to 15/15. SPIRITUAL ASSESSMENT-INTERVENTION-OUTCOME:  (Please note all relevant care assessments, interventions and outcomes. Include all duties performed and important family names, numbers, dynamics, etc.)     PATIENT BELONGINGS:   writing did not handle patient belongings    ANY BELONGINGS OF SIGNIFICANT VALUE NOTED:  N/A    REGISTRATION STAFF NOTIFIED? Yes      WHAT IS YOUR SPIRITUAL CARE PLAN FOR THIS PATIENT?:   Chaplains will remain available to offer spiritual and emotional support as needed.     Electronically signed by Oliver Abbott on 3/10/2023 at 7:41 PM.  [facility]  [phone]

## 2023-03-13 LAB
EST. AVERAGE GLUCOSE BLD GHB EST-MCNC: 151 MG/DL
HBA1C MFR BLD: 6.9 % (ref 4–6)